# Patient Record
Sex: MALE | Race: OTHER | HISPANIC OR LATINO | URBAN - METROPOLITAN AREA
[De-identification: names, ages, dates, MRNs, and addresses within clinical notes are randomized per-mention and may not be internally consistent; named-entity substitution may affect disease eponyms.]

---

## 2017-08-30 ENCOUNTER — OUTPATIENT (OUTPATIENT)
Dept: OUTPATIENT SERVICES | Facility: HOSPITAL | Age: 62
LOS: 1 days | End: 2017-08-30
Payer: COMMERCIAL

## 2017-08-30 VITALS
SYSTOLIC BLOOD PRESSURE: 156 MMHG | HEIGHT: 64 IN | WEIGHT: 149.91 LBS | TEMPERATURE: 98 F | RESPIRATION RATE: 16 BRPM | DIASTOLIC BLOOD PRESSURE: 80 MMHG | HEART RATE: 66 BPM

## 2017-08-30 DIAGNOSIS — I25.10 ATHEROSCLEROTIC HEART DISEASE OF NATIVE CORONARY ARTERY WITHOUT ANGINA PECTORIS: ICD-10-CM

## 2017-08-30 DIAGNOSIS — K42.9 UMBILICAL HERNIA WITHOUT OBSTRUCTION OR GANGRENE: ICD-10-CM

## 2017-08-30 DIAGNOSIS — Z95.1 PRESENCE OF AORTOCORONARY BYPASS GRAFT: Chronic | ICD-10-CM

## 2017-08-30 DIAGNOSIS — I10 ESSENTIAL (PRIMARY) HYPERTENSION: ICD-10-CM

## 2017-08-30 LAB
ALBUMIN SERPL ELPH-MCNC: 4.6 G/DL — SIGNIFICANT CHANGE UP (ref 3.3–5)
ALP SERPL-CCNC: 102 U/L — SIGNIFICANT CHANGE UP (ref 40–120)
ALT FLD-CCNC: 16 U/L — SIGNIFICANT CHANGE UP (ref 4–41)
AST SERPL-CCNC: 24 U/L — SIGNIFICANT CHANGE UP (ref 4–40)
BILIRUB SERPL-MCNC: 0.5 MG/DL — SIGNIFICANT CHANGE UP (ref 0.2–1.2)
BUN SERPL-MCNC: 19 MG/DL — SIGNIFICANT CHANGE UP (ref 7–23)
CALCIUM SERPL-MCNC: 9.8 MG/DL — SIGNIFICANT CHANGE UP (ref 8.4–10.5)
CHLORIDE SERPL-SCNC: 102 MMOL/L — SIGNIFICANT CHANGE UP (ref 98–107)
CO2 SERPL-SCNC: 22 MMOL/L — SIGNIFICANT CHANGE UP (ref 22–31)
CREAT SERPL-MCNC: 0.88 MG/DL — SIGNIFICANT CHANGE UP (ref 0.5–1.3)
GLUCOSE SERPL-MCNC: 112 MG/DL — HIGH (ref 70–99)
HCT VFR BLD CALC: 41 % — SIGNIFICANT CHANGE UP (ref 39–50)
HGB BLD-MCNC: 13.5 G/DL — SIGNIFICANT CHANGE UP (ref 13–17)
MCHC RBC-ENTMCNC: 27.9 PG — SIGNIFICANT CHANGE UP (ref 27–34)
MCHC RBC-ENTMCNC: 32.9 % — SIGNIFICANT CHANGE UP (ref 32–36)
MCV RBC AUTO: 84.7 FL — SIGNIFICANT CHANGE UP (ref 80–100)
NRBC # FLD: 0 — SIGNIFICANT CHANGE UP
PLATELET # BLD AUTO: 218 K/UL — SIGNIFICANT CHANGE UP (ref 150–400)
PMV BLD: 10 FL — SIGNIFICANT CHANGE UP (ref 7–13)
POTASSIUM SERPL-MCNC: 4.2 MMOL/L — SIGNIFICANT CHANGE UP (ref 3.5–5.3)
POTASSIUM SERPL-SCNC: 4.2 MMOL/L — SIGNIFICANT CHANGE UP (ref 3.5–5.3)
PROT SERPL-MCNC: 8.3 G/DL — SIGNIFICANT CHANGE UP (ref 6–8.3)
RBC # BLD: 4.84 M/UL — SIGNIFICANT CHANGE UP (ref 4.2–5.8)
RBC # FLD: 14.4 % — SIGNIFICANT CHANGE UP (ref 10.3–14.5)
SODIUM SERPL-SCNC: 138 MMOL/L — SIGNIFICANT CHANGE UP (ref 135–145)
WBC # BLD: 11.03 K/UL — HIGH (ref 3.8–10.5)
WBC # FLD AUTO: 11.03 K/UL — HIGH (ref 3.8–10.5)

## 2017-08-30 PROCEDURE — 93010 ELECTROCARDIOGRAM REPORT: CPT

## 2017-08-30 NOTE — H&P PST ADULT - NEGATIVE CARDIOVASCULAR SYMPTOMS
no claudication/no peripheral edema/no palpitations/no dyspnea on exertion/no chest pain/no paroxysmal nocturnal dyspnea/no orthopnea

## 2017-08-30 NOTE — H&P PST ADULT - RS GEN PE MLT RESP DETAILS PC
breath sounds equal/good air movement/no rales/no rhonchi/airway patent/no wheezes/respirations non-labored

## 2017-08-30 NOTE — H&P PST ADULT - PMH
CAD (coronary artery disease)    Hepatitis C infection    HTN (hypertension)    Hyperlipidemia    Hyperopia of both eyes with astigmatism and presbyopia  right eye  Psoriasis

## 2017-08-30 NOTE — H&P PST ADULT - NEGATIVE ENMT SYMPTOMS
no tinnitus/no nasal discharge/no abnormal taste sensation/no nasal obstruction/no sinus symptoms/no post-nasal discharge/no vertigo/no gum bleeding/no ear pain/no nasal congestion/no nose bleeds

## 2017-08-30 NOTE — H&P PST ADULT - NSANTHOSAYNRD_GEN_A_CORE
No. ANNI screening performed.  STOP BANG Legend: 0-2 = LOW Risk; 3-4 = INTERMEDIATE Risk; 5-8 = HIGH Risk

## 2017-08-30 NOTE — H&P PST ADULT - PROBLEM SELECTOR PLAN 1
scheduled for umbilical hernia repair on 09/07/17  pending labs, stress/echo report & medical clearance from Dr Vale  surgical scrub & preop instructions given & explained, pt verbalized understanding

## 2017-08-30 NOTE — H&P PST ADULT - FAMILY HISTORY
Mother  Still living? Yes, Estimated age: Age Unknown  Family history of cirrhosis of liver, Age at diagnosis: Age Unknown

## 2017-08-30 NOTE — H&P PST ADULT - HISTORY OF PRESENT ILLNESS
63 y/o male with hx CAD, HTN, Hyperlipidemia, s/p CABG (Double Bypass) - 2015, presents for preop evaluation for Umbilical Hernia Repair on 09/07/17. Pt states he was doing sit-ups 3 weeks ago & felt a pop at umbilical site with pain. Pt was referred for further evaluation & surgery recommended. 61 y/o male with hx CAD, HTN, Hyperlipidemia, s/p CABG (Double Bypass) - 2015, presents for preop evaluation for Umbilical Hernia Repair on 09/07/17. Pt states he was doing sit-ups 3 weeks ago & "felt a pop at umbilical site with pain". Pt was referred for further evaluation & surgery recommended.

## 2017-08-30 NOTE — H&P PST ADULT - VISION (WITH CORRECTIVE LENSES IF THE PATIENT USUALLY WEARS THEM):
blind right eye/Partially impaired: cannot see medication labels or newsprint, but can see obstacles in path, and the surrounding layout; can count fingers at arm's length

## 2017-08-30 NOTE — H&P PST ADULT - NEGATIVE NEUROLOGICAL SYMPTOMS
no syncope/no difficulty walking/no weakness/no headache/no paresthesias/no generalized seizures/no focal seizures/no loss of sensation/no transient paralysis/no tremors

## 2017-08-30 NOTE — H&P PST ADULT - NEGATIVE MUSCULOSKELETAL SYMPTOMS
no back pain/no arthralgia/no arthritis/no stiffness/no neck pain/no muscle cramps/no joint swelling/no myalgia/no muscle weakness

## 2017-09-07 ENCOUNTER — TRANSCRIPTION ENCOUNTER (OUTPATIENT)
Age: 62
End: 2017-09-07

## 2017-09-07 ENCOUNTER — OUTPATIENT (OUTPATIENT)
Dept: OUTPATIENT SERVICES | Facility: HOSPITAL | Age: 62
LOS: 1 days | Discharge: HOME | End: 2017-09-07

## 2017-09-07 VITALS
TEMPERATURE: 98 F | DIASTOLIC BLOOD PRESSURE: 73 MMHG | OXYGEN SATURATION: 99 % | HEART RATE: 47 BPM | RESPIRATION RATE: 16 BRPM | WEIGHT: 149.91 LBS | HEIGHT: 63.78 IN | SYSTOLIC BLOOD PRESSURE: 141 MMHG

## 2017-09-07 VITALS
TEMPERATURE: 98 F | OXYGEN SATURATION: 99 % | SYSTOLIC BLOOD PRESSURE: 140 MMHG | DIASTOLIC BLOOD PRESSURE: 80 MMHG | RESPIRATION RATE: 16 BRPM | HEART RATE: 48 BPM

## 2017-09-07 DIAGNOSIS — Z95.1 PRESENCE OF AORTOCORONARY BYPASS GRAFT: Chronic | ICD-10-CM

## 2017-09-07 DIAGNOSIS — K42.9 UMBILICAL HERNIA WITHOUT OBSTRUCTION OR GANGRENE: ICD-10-CM

## 2017-09-07 NOTE — ASU DISCHARGE PLAN (ADULT/PEDIATRIC). - SPECIAL INSTRUCTIONS
DO NOT take any Tylenol (Acetaminophen) or narcotics containing Tylenol until after  9:30 PM. You received Tylenol during your operation and it can cause damage to your liver if too much is taken within a 24 hour time period.     Do not take aspirin until tomorrow.     No heady lifting. No exercise until cleared by MD.

## 2017-09-07 NOTE — ASU DISCHARGE PLAN (ADULT/PEDIATRIC). - NOTIFY
Persistent Nausea and Vomiting/Bleeding that does not stop/Fever greater than 101/Swelling that continues Pain not relieved by Medications/Fever greater than 101/Persistent Nausea and Vomiting/Bleeding that does not stop/Swelling that continues/Inability to Tolerate Liquids or Foods/Unable to Urinate

## 2017-09-07 NOTE — ASU DISCHARGE PLAN (ADULT/PEDIATRIC). - MEDICATION SUMMARY - MEDICATIONS TO TAKE
I will START or STAY ON the medications listed below when I get home from the hospital:    see medication section  --     -- Indication: For do not take aspirin until tomorrow    Percocet 5/325 oral tablet  -- 1 tab(s) by mouth every 4 hours MDD:6  -- Caution federal law prohibits the transfer of this drug to any person other  than the person for whom it was prescribed.  May cause drowsiness.  Alcohol may intensify this effect.  Use care when operating dangerous machinery.  This prescription cannot be refilled.  This product contains acetaminophen.  Do not use  with any other product containing acetaminophen to prevent possible liver damage.  Using more of this medication than prescribed may cause serious breathing problems.    -- Indication: For s/p umbilical hernia repair. do not drive while taking percocet

## 2018-02-06 ENCOUNTER — APPOINTMENT (OUTPATIENT)
Dept: CARDIOLOGY | Facility: CLINIC | Age: 63
End: 2018-02-06
Payer: COMMERCIAL

## 2018-02-06 VITALS
BODY MASS INDEX: 25.75 KG/M2 | SYSTOLIC BLOOD PRESSURE: 169 MMHG | WEIGHT: 150 LBS | HEART RATE: 53 BPM | DIASTOLIC BLOOD PRESSURE: 86 MMHG | OXYGEN SATURATION: 99 %

## 2018-02-06 PROCEDURE — 99203 OFFICE O/P NEW LOW 30 MIN: CPT

## 2018-02-06 PROCEDURE — 93000 ELECTROCARDIOGRAM COMPLETE: CPT

## 2018-02-06 RX ORDER — ATORVASTATIN CALCIUM 40 MG/1
40 TABLET, FILM COATED ORAL
Qty: 90 | Refills: 0 | Status: DISCONTINUED | COMMUNITY
Start: 2018-01-20

## 2018-02-06 RX ORDER — FLUTICASONE PROPIONATE 50 UG/1
50 SPRAY, METERED NASAL
Qty: 16 | Refills: 0 | Status: DISCONTINUED | COMMUNITY
Start: 2018-01-13

## 2018-02-06 RX ORDER — PREDNISONE 5 MG/1
5 TABLET ORAL
Qty: 30 | Refills: 0 | Status: DISCONTINUED | COMMUNITY
Start: 2017-08-29

## 2018-02-06 RX ORDER — OXYCODONE AND ACETAMINOPHEN 5; 325 MG/1; MG/1
5-325 TABLET ORAL
Qty: 30 | Refills: 0 | Status: DISCONTINUED | COMMUNITY
Start: 2017-09-07

## 2018-02-06 RX ORDER — LEVOCETIRIZINE DIHYDROCHLORIDE 5 MG/1
5 TABLET ORAL
Qty: 90 | Refills: 0 | Status: DISCONTINUED | COMMUNITY
Start: 2018-01-11

## 2018-02-06 RX ORDER — DESONIDE 0.5 MG/G
0.05 CREAM TOPICAL
Qty: 60 | Refills: 0 | Status: DISCONTINUED | COMMUNITY
Start: 2017-10-28

## 2018-02-14 ENCOUNTER — FORM ENCOUNTER (OUTPATIENT)
Age: 63
End: 2018-02-14

## 2018-02-15 ENCOUNTER — OUTPATIENT (OUTPATIENT)
Dept: OUTPATIENT SERVICES | Facility: HOSPITAL | Age: 63
LOS: 1 days | End: 2018-02-15
Payer: COMMERCIAL

## 2018-02-15 ENCOUNTER — APPOINTMENT (OUTPATIENT)
Dept: CT IMAGING | Facility: IMAGING CENTER | Age: 63
End: 2018-02-15
Payer: COMMERCIAL

## 2018-02-15 DIAGNOSIS — I77.9 DISORDER OF ARTERIES AND ARTERIOLES, UNSPECIFIED: ICD-10-CM

## 2018-02-15 DIAGNOSIS — Z95.1 PRESENCE OF AORTOCORONARY BYPASS GRAFT: Chronic | ICD-10-CM

## 2018-02-15 PROCEDURE — 70498 CT ANGIOGRAPHY NECK: CPT

## 2018-02-15 PROCEDURE — 82565 ASSAY OF CREATININE: CPT

## 2018-02-15 PROCEDURE — 70498 CT ANGIOGRAPHY NECK: CPT | Mod: 26

## 2018-02-18 ENCOUNTER — NON-APPOINTMENT (OUTPATIENT)
Age: 63
End: 2018-02-18

## 2018-02-27 ENCOUNTER — NON-APPOINTMENT (OUTPATIENT)
Age: 63
End: 2018-02-27

## 2018-02-27 ENCOUNTER — APPOINTMENT (OUTPATIENT)
Dept: CARDIOLOGY | Facility: CLINIC | Age: 63
End: 2018-02-27
Payer: COMMERCIAL

## 2018-02-27 VITALS — SYSTOLIC BLOOD PRESSURE: 142 MMHG | DIASTOLIC BLOOD PRESSURE: 70 MMHG

## 2018-02-27 VITALS
BODY MASS INDEX: 25.75 KG/M2 | OXYGEN SATURATION: 99 % | WEIGHT: 150 LBS | HEART RATE: 55 BPM | SYSTOLIC BLOOD PRESSURE: 160 MMHG | DIASTOLIC BLOOD PRESSURE: 88 MMHG

## 2018-02-27 DIAGNOSIS — Z87.891 PERSONAL HISTORY OF NICOTINE DEPENDENCE: ICD-10-CM

## 2018-02-27 DIAGNOSIS — Z95.1 PRESENCE OF AORTOCORONARY BYPASS GRAFT: ICD-10-CM

## 2018-02-27 DIAGNOSIS — I77.9 DISORDER OF ARTERIES AND ARTERIOLES, UNSPECIFIED: ICD-10-CM

## 2018-02-27 DIAGNOSIS — I25.10 ATHEROSCLEROTIC HEART DISEASE OF NATIVE CORONARY ARTERY W/OUT ANGINA PECTORIS: ICD-10-CM

## 2018-02-27 PROCEDURE — 99214 OFFICE O/P EST MOD 30 MIN: CPT

## 2018-02-27 PROCEDURE — 93000 ELECTROCARDIOGRAM COMPLETE: CPT

## 2018-04-02 ENCOUNTER — TRANSCRIPTION ENCOUNTER (OUTPATIENT)
Age: 63
End: 2018-04-02

## 2018-04-20 ENCOUNTER — APPOINTMENT (OUTPATIENT)
Dept: VASCULAR SURGERY | Facility: CLINIC | Age: 63
End: 2018-04-20
Payer: COMMERCIAL

## 2018-04-20 VITALS
WEIGHT: 150 LBS | HEIGHT: 64 IN | HEART RATE: 57 BPM | BODY MASS INDEX: 25.61 KG/M2 | TEMPERATURE: 97.6 F | DIASTOLIC BLOOD PRESSURE: 85 MMHG | SYSTOLIC BLOOD PRESSURE: 166 MMHG

## 2018-04-20 DIAGNOSIS — I65.29 OCCLUSION AND STENOSIS OF UNSPECIFIED CAROTID ARTERY: ICD-10-CM

## 2018-04-20 PROCEDURE — 93880 EXTRACRANIAL BILAT STUDY: CPT

## 2018-04-20 PROCEDURE — 93978 VASCULAR STUDY: CPT

## 2018-04-20 PROCEDURE — 99203 OFFICE O/P NEW LOW 30 MIN: CPT

## 2018-07-24 PROBLEM — I10 ESSENTIAL (PRIMARY) HYPERTENSION: Chronic | Status: ACTIVE | Noted: 2017-08-30

## 2018-07-24 PROBLEM — H52.03 HYPERMETROPIA, BILATERAL: Chronic | Status: ACTIVE | Noted: 2017-08-30

## 2018-07-24 PROBLEM — E78.5 HYPERLIPIDEMIA, UNSPECIFIED: Chronic | Status: ACTIVE | Noted: 2017-08-30

## 2018-07-24 PROBLEM — I25.10 ATHEROSCLEROTIC HEART DISEASE OF NATIVE CORONARY ARTERY WITHOUT ANGINA PECTORIS: Chronic | Status: ACTIVE | Noted: 2017-08-30

## 2018-07-27 ENCOUNTER — APPOINTMENT (OUTPATIENT)
Dept: VASCULAR SURGERY | Facility: CLINIC | Age: 63
End: 2018-07-27

## 2018-08-20 ENCOUNTER — APPOINTMENT (OUTPATIENT)
Dept: VASCULAR SURGERY | Facility: CLINIC | Age: 63
End: 2018-08-20
Payer: COMMERCIAL

## 2018-08-20 VITALS
SYSTOLIC BLOOD PRESSURE: 153 MMHG | HEIGHT: 64 IN | DIASTOLIC BLOOD PRESSURE: 76 MMHG | TEMPERATURE: 98 F | BODY MASS INDEX: 25.61 KG/M2 | HEART RATE: 57 BPM | WEIGHT: 150 LBS

## 2018-08-20 PROCEDURE — 99213 OFFICE O/P EST LOW 20 MIN: CPT

## 2018-09-10 ENCOUNTER — APPOINTMENT (OUTPATIENT)
Dept: CARDIOLOGY | Facility: CLINIC | Age: 63
End: 2018-09-10

## 2018-09-25 ENCOUNTER — EMERGENCY (EMERGENCY)
Facility: HOSPITAL | Age: 63
LOS: 1 days | Discharge: HOME | End: 2018-09-25
Attending: EMERGENCY MEDICINE
Payer: COMMERCIAL

## 2018-09-25 VITALS
RESPIRATION RATE: 16 BRPM | DIASTOLIC BLOOD PRESSURE: 87 MMHG | OXYGEN SATURATION: 100 % | HEART RATE: 50 BPM | SYSTOLIC BLOOD PRESSURE: 156 MMHG

## 2018-09-25 VITALS
TEMPERATURE: 98 F | RESPIRATION RATE: 16 BRPM | HEIGHT: 64 IN | WEIGHT: 149.91 LBS | HEART RATE: 63 BPM | DIASTOLIC BLOOD PRESSURE: 72 MMHG | OXYGEN SATURATION: 95 % | SYSTOLIC BLOOD PRESSURE: 155 MMHG

## 2018-09-25 DIAGNOSIS — Z95.1 PRESENCE OF AORTOCORONARY BYPASS GRAFT: Chronic | ICD-10-CM

## 2018-09-25 LAB
ALBUMIN SERPL ELPH-MCNC: 4.5 G/DL — SIGNIFICANT CHANGE UP (ref 3.3–5)
ALP SERPL-CCNC: 102 U/L — SIGNIFICANT CHANGE UP (ref 40–120)
ALT FLD-CCNC: 22 U/L — SIGNIFICANT CHANGE UP (ref 10–45)
ANION GAP SERPL CALC-SCNC: 8 MMOL/L — SIGNIFICANT CHANGE UP (ref 5–17)
APTT BLD: 24.5 SEC — LOW (ref 27.5–37.4)
AST SERPL-CCNC: 27 U/L — SIGNIFICANT CHANGE UP (ref 10–40)
BASOPHILS # BLD AUTO: 0 K/UL — SIGNIFICANT CHANGE UP (ref 0–0.2)
BASOPHILS NFR BLD AUTO: 0.1 % — SIGNIFICANT CHANGE UP (ref 0–2)
BILIRUB SERPL-MCNC: 0.5 MG/DL — SIGNIFICANT CHANGE UP (ref 0.2–1.2)
BUN SERPL-MCNC: 16 MG/DL — SIGNIFICANT CHANGE UP (ref 7–23)
CALCIUM SERPL-MCNC: 9.6 MG/DL — SIGNIFICANT CHANGE UP (ref 8.4–10.5)
CHLORIDE SERPL-SCNC: 104 MMOL/L — SIGNIFICANT CHANGE UP (ref 96–108)
CO2 SERPL-SCNC: 26 MMOL/L — SIGNIFICANT CHANGE UP (ref 22–31)
CREAT SERPL-MCNC: 0.84 MG/DL — SIGNIFICANT CHANGE UP (ref 0.5–1.3)
EOSINOPHIL # BLD AUTO: 0.1 K/UL — SIGNIFICANT CHANGE UP (ref 0–0.5)
EOSINOPHIL NFR BLD AUTO: 2.6 % — SIGNIFICANT CHANGE UP (ref 0–6)
GLUCOSE SERPL-MCNC: 87 MG/DL — SIGNIFICANT CHANGE UP (ref 70–99)
HCT VFR BLD CALC: 42.8 % — SIGNIFICANT CHANGE UP (ref 39–50)
HGB BLD-MCNC: 14.3 G/DL — SIGNIFICANT CHANGE UP (ref 13–17)
INR BLD: 1.07 RATIO — SIGNIFICANT CHANGE UP (ref 0.88–1.16)
LYMPHOCYTES # BLD AUTO: 2 K/UL — SIGNIFICANT CHANGE UP (ref 1–3.3)
LYMPHOCYTES # BLD AUTO: 38.4 % — SIGNIFICANT CHANGE UP (ref 13–44)
MCHC RBC-ENTMCNC: 28.8 PG — SIGNIFICANT CHANGE UP (ref 27–34)
MCHC RBC-ENTMCNC: 33.5 GM/DL — SIGNIFICANT CHANGE UP (ref 32–36)
MCV RBC AUTO: 86.1 FL — SIGNIFICANT CHANGE UP (ref 80–100)
MONOCYTES # BLD AUTO: 0.5 K/UL — SIGNIFICANT CHANGE UP (ref 0–0.9)
MONOCYTES NFR BLD AUTO: 8.9 % — SIGNIFICANT CHANGE UP (ref 2–14)
NEUTROPHILS # BLD AUTO: 2.7 K/UL — SIGNIFICANT CHANGE UP (ref 1.8–7.4)
NEUTROPHILS NFR BLD AUTO: 49.9 % — SIGNIFICANT CHANGE UP (ref 43–77)
OB PNL STL: NEGATIVE — SIGNIFICANT CHANGE UP
PLATELET # BLD AUTO: 233 K/UL — SIGNIFICANT CHANGE UP (ref 150–400)
POTASSIUM SERPL-MCNC: 4.3 MMOL/L — SIGNIFICANT CHANGE UP (ref 3.5–5.3)
POTASSIUM SERPL-SCNC: 4.3 MMOL/L — SIGNIFICANT CHANGE UP (ref 3.5–5.3)
PROT SERPL-MCNC: 8.1 G/DL — SIGNIFICANT CHANGE UP (ref 6–8.3)
PROTHROM AB SERPL-ACNC: 11.6 SEC — SIGNIFICANT CHANGE UP (ref 9.8–12.7)
RBC # BLD: 4.97 M/UL — SIGNIFICANT CHANGE UP (ref 4.2–5.8)
RBC # FLD: 13.9 % — SIGNIFICANT CHANGE UP (ref 10.3–14.5)
SODIUM SERPL-SCNC: 138 MMOL/L — SIGNIFICANT CHANGE UP (ref 135–145)
WBC # BLD: 5.4 K/UL — SIGNIFICANT CHANGE UP (ref 3.8–10.5)
WBC # FLD AUTO: 5.4 K/UL — SIGNIFICANT CHANGE UP (ref 3.8–10.5)

## 2018-09-25 PROCEDURE — 85730 THROMBOPLASTIN TIME PARTIAL: CPT

## 2018-09-25 PROCEDURE — 82272 OCCULT BLD FECES 1-3 TESTS: CPT

## 2018-09-25 PROCEDURE — 99284 EMERGENCY DEPT VISIT MOD MDM: CPT

## 2018-09-25 PROCEDURE — 99283 EMERGENCY DEPT VISIT LOW MDM: CPT

## 2018-09-25 PROCEDURE — 80053 COMPREHEN METABOLIC PANEL: CPT

## 2018-09-25 PROCEDURE — 85610 PROTHROMBIN TIME: CPT

## 2018-09-25 PROCEDURE — 85027 COMPLETE CBC AUTOMATED: CPT

## 2018-09-25 NOTE — ED PROVIDER NOTE - NS ED ROS FT
GENERAL: No fever or chills, //             EYES: no change in vision, //             HEENT: no trouble swallowing or speaking, //             CARDIAC: no chest pain, //              PULMONARY: no cough or SOB, //             GI: no abdominal pain, no nausea or no vomiting, reports hematochezia, //             : No changes in urination,  //            SKIN: no rashes,  //            NEURO: no headache,  //             MSK: No joint pain otherwise as HPI or negative. ~Mike Koroma DO PGY1

## 2018-09-25 NOTE — ED PROVIDER NOTE - PHYSICAL EXAMINATION
General: well appearing, interactive, well nourished, no apparent distress  HEENT: extraocular movments intact, pupils equal and reactive, normal mucosa, normal oropharynx, no lesions on the lips or on oral mucosa, normal external ear, no subconjunctival pallor  Neck: supple, no lymphadeopathy, full range of motion, no nuchal rigidity  CV: regular rate and rhythm, normal S1 and S2 with no murmur, capillary refill less than two seconds  Resp: lungs clear to auscultation bilaterally, good aeration bilaterally, symmetric chest wall   Abd: soft, nontender, nondistended, normoactive bowel sounds, no organomegaly, no pulsating masses  : no CVA tenderness  MSK: full range of motion, no cyanosis, no edema, no clubbing, no immobility  Neuro: cranial nerves intact, muscle strength 5/5 in all extremities  Skin: no rashes

## 2018-09-25 NOTE — ED PROVIDER NOTE - ATTENDING CONTRIBUTION TO CARE
62 y/o male with the above documented history and HPI who on exam appears very well and comfortable. VSs noted, sclerae anicteric, MM's moist, neck supple, lungs CTA, cardiac sounds s/ audible m/r/g, abdomen soft, NT/ND, rectal as per Dr. Koroma, extremities s/ asymmetry, skin s/ rash or jaundice and neurologically intact. Screening labs ordered but there is nothing clinically evident to suggest any emergent lower GIB or upper c/ rapid transit. With now 4-5 daily BMs of the same, we will follow his labs which likely determine his ultimate treatment and disposition.

## 2018-09-25 NOTE — ED ADULT NURSE NOTE - NSIMPLEMENTINTERV_GEN_ALL_ED
Implemented All Universal Safety Interventions:  [place] to call system. Call bell, personal items and telephone within reach. Instruct patient to call for assistance. Room bathroom lighting operational. Non-slip footwear when patient is off stretcher. Physically safe environment: no spills, clutter or unnecessary equipment. Stretcher in lowest position, wheels locked, appropriate side rails in place.

## 2018-09-25 NOTE — ED PROVIDER NOTE - PROGRESS NOTE DETAILS
rectal exam performed. no gross blood or melena seen. no external hemorrhoids or lesions noted around anus. no internal hemorrhoids palpated. exam performed with fahad, PAULA Crooks  - Mike Koroma D.O. PGY1 ED eval unremarkable. Not in need of any additional emergent investigation or intervention. Discharged with results, instructions and follow-up.

## 2018-09-25 NOTE — ED PROVIDER NOTE - OBJECTIVE STATEMENT
64 YO M hx of CAD s/p bypass, liver cirrhosis, s/p umbilical hernia, hep C, pw bloody BMs. pt states for last few days he has noted the blood in the toilet bowl as well as blood on toilet paper after wiping. takes 1 325 mg ASA daily. states he has had similar episodes in the past however none have ever persisted for this long. reports hx of hemorrhoids, tx preparation H. last colonoscopy 3-4 years ago and reported normal. reports recent constipation and feeling "bloated". former IV drug use, clean last ten years. denies f/c, n/v, abd pain, hx of a fib, syncope, lightheadedness, hx of bleeding disorders, hx of CA in family.

## 2018-09-25 NOTE — ED ADULT NURSE NOTE - OBJECTIVE STATEMENT
Pt presents to ED awake, alert and ambulatory, c/o abdominal distension and BRBPR x 3 days. Pt reports he thinks he has hemorrhoids and has been seeing blood in the toilet when he strains to have a BM. Stool is soft and normal color for pt. Pt denies abdominal pain but feels like he is constipated and his belly is larger than normal. Pt states he has been exercising more than normal and thinks he may have "pulled something." Pt denies nausea, vomiting, or diarrhea, denies fever, dizziness, lightheadedness or sob. Abdomen appears distended. Skin color normal for race.

## 2018-10-02 ENCOUNTER — APPOINTMENT (OUTPATIENT)
Dept: GASTROENTEROLOGY | Facility: CLINIC | Age: 63
End: 2018-10-02
Payer: COMMERCIAL

## 2018-10-02 VITALS
SYSTOLIC BLOOD PRESSURE: 129 MMHG | HEIGHT: 64 IN | DIASTOLIC BLOOD PRESSURE: 74 MMHG | HEART RATE: 61 BPM | WEIGHT: 149 LBS | BODY MASS INDEX: 25.44 KG/M2

## 2018-10-02 DIAGNOSIS — B18.2 CHRONIC VIRAL HEPATITIS C: ICD-10-CM

## 2018-10-02 DIAGNOSIS — K59.00 CONSTIPATION, UNSPECIFIED: ICD-10-CM

## 2018-10-02 PROCEDURE — 99244 OFF/OP CNSLTJ NEW/EST MOD 40: CPT

## 2018-10-02 PROCEDURE — 82274 ASSAY TEST FOR BLOOD FECAL: CPT | Mod: QW

## 2018-10-29 ENCOUNTER — APPOINTMENT (OUTPATIENT)
Dept: VASCULAR SURGERY | Facility: CLINIC | Age: 63
End: 2018-10-29
Payer: COMMERCIAL

## 2018-10-29 VITALS
HEIGHT: 64 IN | SYSTOLIC BLOOD PRESSURE: 130 MMHG | HEART RATE: 59 BPM | DIASTOLIC BLOOD PRESSURE: 78 MMHG | WEIGHT: 149 LBS | BODY MASS INDEX: 25.44 KG/M2 | TEMPERATURE: 98.5 F

## 2018-10-29 PROCEDURE — 99213 OFFICE O/P EST LOW 20 MIN: CPT

## 2018-10-29 PROCEDURE — 93880 EXTRACRANIAL BILAT STUDY: CPT

## 2018-11-21 ENCOUNTER — APPOINTMENT (OUTPATIENT)
Dept: GASTROENTEROLOGY | Facility: CLINIC | Age: 63
End: 2018-11-21

## 2018-12-10 ENCOUNTER — OUTPATIENT (OUTPATIENT)
Dept: OUTPATIENT SERVICES | Facility: HOSPITAL | Age: 63
LOS: 1 days | Discharge: HOME | End: 2018-12-10
Payer: COMMERCIAL

## 2018-12-10 ENCOUNTER — APPOINTMENT (OUTPATIENT)
Dept: GASTROENTEROLOGY | Facility: HOSPITAL | Age: 63
End: 2018-12-10

## 2018-12-10 DIAGNOSIS — Z95.1 PRESENCE OF AORTOCORONARY BYPASS GRAFT: Chronic | ICD-10-CM

## 2018-12-10 DIAGNOSIS — K59.00 CONSTIPATION, UNSPECIFIED: ICD-10-CM

## 2018-12-10 PROCEDURE — 45378 DIAGNOSTIC COLONOSCOPY: CPT

## 2019-01-24 ENCOUNTER — APPOINTMENT (OUTPATIENT)
Dept: GASTROENTEROLOGY | Facility: CLINIC | Age: 64
End: 2019-01-24

## 2019-02-05 ENCOUNTER — APPOINTMENT (OUTPATIENT)
Dept: GASTROENTEROLOGY | Facility: CLINIC | Age: 64
End: 2019-02-05
Payer: COMMERCIAL

## 2019-02-05 VITALS
DIASTOLIC BLOOD PRESSURE: 69 MMHG | SYSTOLIC BLOOD PRESSURE: 136 MMHG | HEART RATE: 55 BPM | HEIGHT: 64 IN | WEIGHT: 145 LBS | BODY MASS INDEX: 24.75 KG/M2

## 2019-02-05 DIAGNOSIS — K62.5 HEMORRHAGE OF ANUS AND RECTUM: ICD-10-CM

## 2019-02-05 DIAGNOSIS — K64.4 RESIDUAL HEMORRHOIDAL SKIN TAGS: ICD-10-CM

## 2019-02-05 DIAGNOSIS — K21.9 GASTRO-ESOPHAGEAL REFLUX DISEASE W/OUT ESOPHAGITIS: ICD-10-CM

## 2019-02-05 DIAGNOSIS — R14.2 ERUCTATION: ICD-10-CM

## 2019-02-05 PROCEDURE — 99214 OFFICE O/P EST MOD 30 MIN: CPT

## 2019-02-05 RX ORDER — OMEPRAZOLE 20 MG/1
20 CAPSULE, DELAYED RELEASE ORAL DAILY
Qty: 30 | Refills: 0 | Status: COMPLETED | COMMUNITY
Start: 2019-02-05 | End: 2019-03-07

## 2019-02-05 RX ORDER — ASPIRIN 81 MG
81 TABLET, DELAYED RELEASE (ENTERIC COATED) ORAL
Refills: 0 | Status: ACTIVE | COMMUNITY

## 2019-02-05 RX ORDER — POLYETHYLENE GLYCOL 3350 AND ELECTROLYTES WITH LEMON FLAVOR 236; 22.74; 6.74; 5.86; 2.97 G/4L; G/4L; G/4L; G/4L; G/4L
236 POWDER, FOR SOLUTION ORAL
Qty: 1 | Refills: 0 | Status: DISCONTINUED | COMMUNITY
Start: 2018-10-02 | End: 2019-02-05

## 2019-02-05 NOTE — REVIEW OF SYSTEMS
[Feeling Poorly] : feeling poorly [Feeling Tired] : feeling tired [Heartburn] : heartburn [Negative] : Heme/Lymph

## 2019-02-05 NOTE — REASON FOR VISIT
[Follow-Up: _____] : a [unfilled] follow-up visit [FreeTextEntry1] : abdominal discomfort, bloating, burping, constipation

## 2019-02-05 NOTE — HISTORY OF PRESENT ILLNESS
[Heartburn] : denies heartburn [Nausea] : denies nausea [Vomiting] : denies vomiting [Diarrhea] : denies diarrhea [Constipation] : stable constipation [Yellow Skin Or Eyes (Jaundice)] : denies jaundice [Abdominal Pain] : denies abdominal pain [Abdominal Swelling] : denies abdominal swelling [Rectal Pain] : denies rectal pain [_________] : Performed [unfilled] [de-identified] : Jonny presents to the office today for follow up with complaints of eructation, bloating and constipation.  He was last seen when he underwent a colonoscopy on December 10, 2018\par \par He reports that after his procedure, he vague discomfort and headaches for 2-3 days.  At first, he bowel movements were good, but he has started to experience constipation, and is moving his bowels every 1-2 days.  He previously took Benefiber, but started taking Metamucil one week ago.  He has taken Miralax if he does not go for more than 2 days, and has taken it 2 or 3 times since his test.  He denies any further rectal bleeding.  He denies heartburn, but has experienced bloating and eructation at night, which seems to be more prominent when he is constipated.  \par \par He underwent a colonoscopy on December 10, 2018 for rectal bleeding which only showed internal hemorrhoids.  He also has a history of Hepatitis C which was treated at [facility]. [de-identified] : hemorrhoids

## 2019-02-05 NOTE — ASSESSMENT
[FreeTextEntry1] : 1.  Constipation.  Colonoscopy in December 2018 with hemorrhoids.\par 2.  History of rectal bleeding secondary to hemorrhoids, resolved.\par 3.  Eructation, bloating.  Differential includes GERD, gastritis, functional dyspepsia, medication induced, SIBO, lactose intolerance.\par 4.  History of hepatitis C status post Harvoni.\par 5.  CAD status post CABG.\par 6.  Moderate carotid stenosis.\par 7.  HTN.\par 8.  HLD.\par 9.  Diabetes.\par 10.  Former smoker.\par \par Recs:\par - Previous colonoscopy results reviewed.\par - The patient was advised to increase fluid and dietary fiber intake.  In addition, the patient was advised to continue Metamucil daily and Miralax as needed for severe constipation.\par - Patient was counseled on GERD lifestyle modifications including head of bed elevation at night, avoiding lying down 2-3 hours after eating, weight loss, avoiding tight-fitting clothing, eating small, frequent meals, and minimizing potential food triggers (caffeine, spicy foods, citrus foods, chocolate, mint, alcohol).\par - Trial of omeprazole 20 mg for one month.  May use ranitidine as needed after that.\par \par

## 2019-02-05 NOTE — PHYSICAL EXAM
[General Appearance - Alert] : alert [General Appearance - In No Acute Distress] : in no acute distress [General Appearance - Well Nourished] : well nourished [Sclera] : the sclera and conjunctiva were normal [PERRL With Normal Accommodation] : pupils were equal in size, round, and reactive to light [Extraocular Movements] : extraocular movements were intact [Outer Ear] : the ears and nose were normal in appearance [Hearing Threshold Finger Rub Not [place]] : hearing was normal [Neck Appearance] : the appearance of the neck was normal [Neck Cervical Mass (___cm)] : no neck mass was observed [Auscultation Breath Sounds / Voice Sounds] : lungs were clear to auscultation bilaterally [Heart Rate And Rhythm] : heart rate was normal and rhythm regular [Heart Sounds] : normal S1 and S2 [Edema] : there was no peripheral edema [Bowel Sounds] : normal bowel sounds [Abdomen Soft] : soft [Abdomen Tenderness] : non-tender [] : no hepato-splenomegaly [Abdomen Mass (___ Cm)] : no abdominal mass palpated [Abdomen Hernia] : no hernia was discovered [Cervical Lymph Nodes Enlarged Anterior Bilaterally] : anterior cervical [Supraclavicular Lymph Nodes Enlarged Bilaterally] : supraclavicular [No CVA Tenderness] : no ~M costovertebral angle tenderness [No Spinal Tenderness] : no spinal tenderness [Abnormal Walk] : normal gait [Skin Color & Pigmentation] : normal skin color and pigmentation [No Focal Deficits] : no focal deficits [Oriented To Time, Place, And Person] : oriented to person, place, and time

## 2019-06-10 ENCOUNTER — APPOINTMENT (OUTPATIENT)
Dept: VASCULAR SURGERY | Facility: CLINIC | Age: 64
End: 2019-06-10

## 2019-07-11 ENCOUNTER — APPOINTMENT (OUTPATIENT)
Dept: VASCULAR SURGERY | Facility: CLINIC | Age: 64
End: 2019-07-11
Payer: COMMERCIAL

## 2019-07-11 VITALS
SYSTOLIC BLOOD PRESSURE: 130 MMHG | HEIGHT: 64 IN | BODY MASS INDEX: 24.92 KG/M2 | TEMPERATURE: 98.4 F | HEART RATE: 60 BPM | WEIGHT: 146 LBS | DIASTOLIC BLOOD PRESSURE: 70 MMHG

## 2019-07-11 PROCEDURE — 99213 OFFICE O/P EST LOW 20 MIN: CPT

## 2019-07-11 PROCEDURE — 93880 EXTRACRANIAL BILAT STUDY: CPT

## 2019-07-11 NOTE — ASSESSMENT
[FreeTextEntry1] : Patient underwent duplex study which shows a 60-79% right internal carotid artery stenosis. Because patient is asymptomatic no intervention is necessary at this time. Patient should have a repeat duplex in 6 months and continue on aspirin at this time.

## 2019-07-11 NOTE — PHYSICAL EXAM
[JVD] : no jugular venous distention  [Normal Breath Sounds] : Normal breath sounds [Normal Rate and Rhythm] : normal rate and rhythm [2+] : left 2+ [Ankle Swelling (On Exam)] : not present [Varicose Veins Of Lower Extremities] : not present [] : not present [Abdomen Tenderness] : ~T ~M No abdominal tenderness [No Rash or Lesion] : No rash or lesion [de-identified] : appears well [Alert] : alert [Calm] : calm [de-identified] : sensorimotor intact

## 2019-07-11 NOTE — CONSULT LETTER
[Dear  ___] : Dear  [unfilled], [Courtesy Letter:] : I had the pleasure of seeing your patient, [unfilled], in my office today. [Please see my note below.] : Please see my note below. [FreeTextEntry3] : Liu Fink M.D., FCHETNA., R.P.V.I.\par \par  [facility]\par  of Vascular Surgery\par Vascular [facility] at [place]\par  [Sincerely,] : Sincerely,

## 2019-07-11 NOTE — HISTORY OF PRESENT ILLNESS
[FreeTextEntry1] : 64-year-old gentleman with a long history of carotid stenosis. Patient has no history of stroke or TIA.  He is here for followup

## 2020-01-14 ENCOUNTER — APPOINTMENT (OUTPATIENT)
Dept: VASCULAR SURGERY | Facility: CLINIC | Age: 65
End: 2020-01-14
Payer: COMMERCIAL

## 2020-01-14 PROCEDURE — 93880 EXTRACRANIAL BILAT STUDY: CPT

## 2020-01-14 PROCEDURE — 99213 OFFICE O/P EST LOW 20 MIN: CPT

## 2020-01-14 NOTE — HISTORY OF PRESENT ILLNESS
[FreeTextEntry1] : 65 yo male with history of cad s/p cabg, carotid stenosis presents for follow up.  pt without any complaints denies any history of cva

## 2020-01-14 NOTE — PHYSICAL EXAM
[No Rash or Lesion] : No rash or lesion [Alert] : alert [Calm] : calm [Ankle Swelling (On Exam)] : not present [JVD] : no jugular venous distention  [de-identified] : appears well  [Skin Ulcer] : no ulcer

## 2020-01-14 NOTE — ASSESSMENT
[FreeTextEntry1] : 65 yo male with history of cad s/p cabg, carotid stenosis presents for follow up.\par \par duplex shows increase in right ica stenosis to 60-70% with ratio of 3.9 \par \par at this time would recommend right ica endarterectomy

## 2020-01-31 ENCOUNTER — OUTPATIENT (OUTPATIENT)
Dept: OUTPATIENT SERVICES | Facility: HOSPITAL | Age: 65
LOS: 1 days | End: 2020-01-31
Payer: COMMERCIAL

## 2020-01-31 VITALS
DIASTOLIC BLOOD PRESSURE: 75 MMHG | WEIGHT: 149.91 LBS | HEART RATE: 50 BPM | TEMPERATURE: 98 F | HEIGHT: 64 IN | OXYGEN SATURATION: 99 % | RESPIRATION RATE: 16 BRPM | SYSTOLIC BLOOD PRESSURE: 163 MMHG

## 2020-01-31 DIAGNOSIS — Z91.89 OTHER SPECIFIED PERSONAL RISK FACTORS, NOT ELSEWHERE CLASSIFIED: ICD-10-CM

## 2020-01-31 DIAGNOSIS — Z01.818 ENCOUNTER FOR OTHER PREPROCEDURAL EXAMINATION: ICD-10-CM

## 2020-01-31 DIAGNOSIS — I65.29 OCCLUSION AND STENOSIS OF UNSPECIFIED CAROTID ARTERY: ICD-10-CM

## 2020-01-31 DIAGNOSIS — E11.9 TYPE 2 DIABETES MELLITUS WITHOUT COMPLICATIONS: ICD-10-CM

## 2020-01-31 DIAGNOSIS — Z29.9 ENCOUNTER FOR PROPHYLACTIC MEASURES, UNSPECIFIED: ICD-10-CM

## 2020-01-31 DIAGNOSIS — I25.10 ATHEROSCLEROTIC HEART DISEASE OF NATIVE CORONARY ARTERY WITHOUT ANGINA PECTORIS: ICD-10-CM

## 2020-01-31 DIAGNOSIS — Z95.1 PRESENCE OF AORTOCORONARY BYPASS GRAFT: Chronic | ICD-10-CM

## 2020-01-31 DIAGNOSIS — Z98.890 OTHER SPECIFIED POSTPROCEDURAL STATES: Chronic | ICD-10-CM

## 2020-01-31 LAB
ALBUMIN SERPL ELPH-MCNC: 4.4 G/DL — SIGNIFICANT CHANGE UP (ref 3.3–5)
ALP SERPL-CCNC: 104 U/L — SIGNIFICANT CHANGE UP (ref 40–120)
ALT FLD-CCNC: 30 U/L — SIGNIFICANT CHANGE UP (ref 10–45)
ANION GAP SERPL CALC-SCNC: 13 MMOL/L — SIGNIFICANT CHANGE UP (ref 5–17)
AST SERPL-CCNC: 30 U/L — SIGNIFICANT CHANGE UP (ref 10–40)
BILIRUB SERPL-MCNC: 0.5 MG/DL — SIGNIFICANT CHANGE UP (ref 0.2–1.2)
BLD GP AB SCN SERPL QL: NEGATIVE — SIGNIFICANT CHANGE UP
BUN SERPL-MCNC: 16 MG/DL — SIGNIFICANT CHANGE UP (ref 7–23)
CALCIUM SERPL-MCNC: 9.5 MG/DL — SIGNIFICANT CHANGE UP (ref 8.4–10.5)
CHLORIDE SERPL-SCNC: 105 MMOL/L — SIGNIFICANT CHANGE UP (ref 96–108)
CO2 SERPL-SCNC: 21 MMOL/L — LOW (ref 22–31)
CREAT SERPL-MCNC: 0.75 MG/DL — SIGNIFICANT CHANGE UP (ref 0.5–1.3)
GLUCOSE SERPL-MCNC: 80 MG/DL — SIGNIFICANT CHANGE UP (ref 70–99)
HBA1C BLD-MCNC: 6.9 % — HIGH (ref 4–5.6)
HCT VFR BLD CALC: 39.6 % — SIGNIFICANT CHANGE UP (ref 39–50)
HGB BLD-MCNC: 11.9 G/DL — LOW (ref 13–17)
MCHC RBC-ENTMCNC: 24.6 PG — LOW (ref 27–34)
MCHC RBC-ENTMCNC: 30.1 GM/DL — LOW (ref 32–36)
MCV RBC AUTO: 82 FL — SIGNIFICANT CHANGE UP (ref 80–100)
NRBC # BLD: 0 /100 WBCS — SIGNIFICANT CHANGE UP (ref 0–0)
PLATELET # BLD AUTO: 209 K/UL — SIGNIFICANT CHANGE UP (ref 150–400)
POTASSIUM SERPL-MCNC: 4.3 MMOL/L — SIGNIFICANT CHANGE UP (ref 3.5–5.3)
POTASSIUM SERPL-SCNC: 4.3 MMOL/L — SIGNIFICANT CHANGE UP (ref 3.5–5.3)
PROT SERPL-MCNC: 7.8 G/DL — SIGNIFICANT CHANGE UP (ref 6–8.3)
RBC # BLD: 4.83 M/UL — SIGNIFICANT CHANGE UP (ref 4.2–5.8)
RBC # FLD: 15.9 % — HIGH (ref 10.3–14.5)
RH IG SCN BLD-IMP: NEGATIVE — SIGNIFICANT CHANGE UP
SODIUM SERPL-SCNC: 139 MMOL/L — SIGNIFICANT CHANGE UP (ref 135–145)
WBC # BLD: 4.37 K/UL — SIGNIFICANT CHANGE UP (ref 3.8–10.5)
WBC # FLD AUTO: 4.37 K/UL — SIGNIFICANT CHANGE UP (ref 3.8–10.5)

## 2020-01-31 PROCEDURE — 86850 RBC ANTIBODY SCREEN: CPT

## 2020-01-31 PROCEDURE — 86900 BLOOD TYPING SEROLOGIC ABO: CPT

## 2020-01-31 PROCEDURE — 83036 HEMOGLOBIN GLYCOSYLATED A1C: CPT

## 2020-01-31 PROCEDURE — G0463: CPT

## 2020-01-31 PROCEDURE — 80053 COMPREHEN METABOLIC PANEL: CPT

## 2020-01-31 PROCEDURE — 85027 COMPLETE CBC AUTOMATED: CPT

## 2020-01-31 PROCEDURE — 86901 BLOOD TYPING SEROLOGIC RH(D): CPT

## 2020-01-31 RX ORDER — ATORVASTATIN CALCIUM 80 MG/1
1 TABLET, FILM COATED ORAL
Qty: 0 | Refills: 0 | DISCHARGE

## 2020-01-31 RX ORDER — LIDOCAINE HCL 20 MG/ML
0.2 VIAL (ML) INJECTION ONCE
Refills: 0 | Status: DISCONTINUED | OUTPATIENT
Start: 2020-02-12 | End: 2020-02-13

## 2020-01-31 RX ORDER — SODIUM CHLORIDE 9 MG/ML
3 INJECTION INTRAMUSCULAR; INTRAVENOUS; SUBCUTANEOUS EVERY 8 HOURS
Refills: 0 | Status: DISCONTINUED | OUTPATIENT
Start: 2020-02-12 | End: 2020-02-13

## 2020-01-31 NOTE — H&P PST ADULT - NSICDXPASTMEDICALHX_GEN_ALL_CORE_FT
PAST MEDICAL HISTORY:  CAD (coronary artery disease)     Diabetes mellitus, type 2     Hearing loss right ear    Hepatitis C infection treated with Artis 2015    HTN (hypertension)     Hyperlipidemia     Hyperopia of both eyes with astigmatism and presbyopia right eye    Occlusion of carotid artery right    Psoriasis PAST MEDICAL HISTORY:  CAD (coronary artery disease)     Diabetes mellitus, type 2     Hearing loss right ear    Hepatitis C infection treated with Harvoni 2015    History of alcohol abuse none since 2008    History of drug abuse none since 2008    HTN (hypertension)     Hyperlipidemia     Hyperopia of both eyes with astigmatism and presbyopia right eye    Occlusion of carotid artery right ICA 60-79%    Psoriasis

## 2020-01-31 NOTE — H&P PST ADULT - ABILITY TO HEAR (WITH HEARING AID OR HEARING APPLIANCE IF NORMALLY USED):
Mildly to Moderately Impaired: difficulty hearing in some environments or speaker may need to increase volume or speak distinctly/right hearing loss

## 2020-01-31 NOTE — H&P PST ADULT - HISTORY OF PRESENT ILLNESS
63 yo male with h/o HTN, HLD. Type 2 DM, CAD s/p CABG x 2 2015, Hepatitis C (treated with Harvoni 2015) and carotid artery stenosis s/p Carotid Dopplers 1/14/2020, which revealed 60-79% stenosis in the right internal carotid artery. Pt is scheduled for Right CEA on 2/12/2020.

## 2020-01-31 NOTE — H&P PST ADULT - DOES PATIENT MEET CRITERIA FOR SEPSIS
"  SUBJECTIVE:   Emery Barkley is a 44 year old male who presents to clinic today for the following health issues:  Chief Complaint   Patient presents with     Wart     retreat. Was seen 5/29 and warts on neck and shaving area were cryo treated.     Patient had a cluster of warts on his neck.  He has not shaved.  They have improved.  No complication from the last treatment with liquid ntg.          Problem list and histories reviewed & adjusted, as indicated.  Additional history: as documented        Reviewed and updated as needed this visit by clinical staff  Tobacco  Allergies  Meds  Med Hx  Surg Hx  Fam Hx  Soc Hx      Reviewed and updated as needed this visit by Provider         ROS:  CONSTITUTIONAL:NEGATIVE for fever, chills, change in weight  INTEGUMENTARY/SKIN: as above    OBJECTIVE:                                                    /82 (Cuff Size: Adult Large)  Pulse 76  Temp 97.3  F (36.3  C) (Tympanic)  Ht 5' 9.5\" (1.765 m)  Wt 206 lb (93.4 kg)  BMI 29.98 kg/m2  Body mass index is 29.98 kg/(m^2).  GENERAL APPEARANCE: healthy, alert and no distress  SKIN: he has about 10 warts on neck within the beard area.  They are generally all smaller.    Instructed on treatment and he agreed.  3 freeze thaw cycles done without complications.           ASSESSMENT/PLAN:                                                    1. Common wart  Retreated 10 warts ranging from 1 mm diameter to the largest to 4 mm. All elevated and verrucous like lesion.  Follow up in 3 weeks.  Last time there were 14 warts.  Today 10.  - DESTRUCT BENIGN LESION, UP TO 14      See Patient Instructions    Devyn Cox MD  [facility]  "
No

## 2020-01-31 NOTE — H&P PST ADULT - NSICDXFAMILYHX_GEN_ALL_CORE_FT
FAMILY HISTORY:  Mother  Still living? Yes, Estimated age: Age Unknown  Family history of cirrhosis of liver, Age at diagnosis: Age Unknown

## 2020-01-31 NOTE — H&P PST ADULT - ASSESSMENT
1.	Carotid Artery Stenosis  2.	T2DM  3.	CAD  4.	At Risk For Sleep Apnea  CAPRINI VTE 2.0 SCORE [CLOT updated 2019]    AGE RELATED RISK FACTORS                                                       MOBILITY RELATED FACTORS  [ ] Age 41-60 years                                            (1 Point)                    [ ] Bed rest                                                        (1 Point)  [x ] Age: 61-74 years                                           (2 Points)                  [ ] Plaster cast                                                   (2 Points)  [ ] Age= 75 years                                              (3 Points)                    [ ] Bed bound for more than 72 hours                 (2 Points)    DISEASE RELATED RISK FACTORS                                               GENDER SPECIFIC FACTORS  [ ] Edema in the lower extremities                       (1 Point)              [ ] Pregnancy                                                     (1 Point)  [ ] Varicose veins                                               (1 Point)                     [ ] Post-partum < 6 weeks                                   (1 Point)             [ x] BMI > 25 Kg/m2                                            (1 Point)                     [ ] Hormonal therapy  or oral contraception          (1 Point)                 [ ] Sepsis (in the previous month)                        (1 Point)               [ ] History of pregnancy complications                 (1 point)  [ ] Pneumonia or serious lung disease                                               [ ] Unexplained or recurrent                     (1 Point)           (in the previous month)                               (1 Point)  [ ] Abnormal pulmonary function test                     (1 Point)                 SURGERY RELATED RISK FACTORS  [ ] Acute myocardial infarction                              (1 Point)               [ ]  Section                                             (1 Point)  [ ] Congestive heart failure (in the previous month)  (1 Point)      [ ] Minor surgery                                                  (1 Point)   [ ] Inflammatory bowel disease                             (1 Point)               [ ] Arthroscopic surgery                                        (2 Points)  [ ] Central venous access                                      (2 Points)                [x ] General surgery lasting more than 45 minutes (2 points)  [ ] Malignancy- Present or previous                   (2 Points)                [ ] Elective arthroplasty                                         (5 points)    [ ] Stroke (in the previous month)                          (5 Points)                                                                                                                                                           HEMATOLOGY RELATED FACTORS                                                 TRAUMA RELATED RISK FACTORS  [ ] Prior episodes of VTE                                     (3 Points)                [ ] Fracture of the hip, pelvis, or leg                       (5 Points)  [ ] Positive family history for VTE                         (3 Points)             [ ] Acute spinal cord injury (in the previous month)  (5 Points)  [ ] Prothrombin 23290 A                                     (3 Points)               [ ] Paralysis  (less than 1 month)                             (5 Points)  [ ] Factor V Leiden                                             (3 Points)                  [ ] Multiple Trauma within 1 month                        (5 Points)  [ ] Lupus anticoagulants                                     (3 Points)                                                           [ ] Anticardiolipin antibodies                               (3 Points)                                                       [ ] High homocysteine in the blood                      (3 Points)                                             [ ] Other congenital or acquired thrombophilia      (3 Points)                                                [ ] Heparin induced thrombocytopenia                  (3 Points)                                     Total Score [       5   ]

## 2020-01-31 NOTE — H&P PST ADULT - NSICDXPROBLEM_GEN_ALL_CORE_FT
PROBLEM DIAGNOSES  Problem: Carotid artery stenosis  Assessment and Plan: Right CEA    Problem: Type 2 diabetes mellitus  Assessment and Plan: Pt instructed to hold metformin 24 hours preop and check FS on am of surgery  Stat FS on admission    Problem: CAD, multiple vessel  Assessment and Plan: Called Cardiologist Dr Da Silva's office to clarify Medical Evaluation 1/2019 notes regarding abnormal Stress Test in 7/2019 - awaiting response  Pt will continue aspirin to OR per surgeon    Problem: At risk for sleep apnea  Assessment and Plan: ANNI Precautions  OR Booking notified    Problem: Need for prophylactic measure  Assessment and Plan: The Caprini score indicates this patient is at risk for a VTE event (score 3-5).  Most surgical patients in this group would benefit from pharmacologic prophylaxis.  The surgical team will determine the balance between VTE risk and bleeding risk

## 2020-01-31 NOTE — H&P PST ADULT - NSICDXPASTSURGICALHX_GEN_ALL_CORE_FT
PAST SURGICAL HISTORY:  H/O colonoscopy 2019    H/O coronary artery bypass surgery CABG x 2 -10/15/2015    History of umbilical hernia repair 2017

## 2020-02-11 ENCOUNTER — TRANSCRIPTION ENCOUNTER (OUTPATIENT)
Age: 65
End: 2020-02-11

## 2020-02-11 VITALS
HEART RATE: 50 BPM | TEMPERATURE: 98 F | HEIGHT: 64 IN | SYSTOLIC BLOOD PRESSURE: 163 MMHG | OXYGEN SATURATION: 99 % | DIASTOLIC BLOOD PRESSURE: 75 MMHG | RESPIRATION RATE: 16 BRPM | WEIGHT: 149.91 LBS

## 2020-02-11 NOTE — PRE-ANESTHESIA EVALUATION ADULT - NSRADCARDRESULTSFT_GEN_ALL_CORE
[id number]: Global left ventricular function was normal. EF estimated was  55 %.  CORONARY VESSELS: The coronary circulation is left dominant.  LM:   --  Proximal left main: There was a discrete 60 % stenosis.  LAD:   --  Ostial LAD: There was a 70 % stenosis.  --  Proximal LAD: There was a 90 % stenosis.  CX:   --  Proximal circumflex: There was a 90 % stenosis.  --  LPDA: There was a discrete 40 % stenosis in the proximal third of the  vessel segment.  RI:   --  Proximal ramus intermedius: There was a 50 % stenosis.  RCA:   --  Mid RCA: There was a 100 % stenosis.

## 2020-02-11 NOTE — PRE-ANESTHESIA EVALUATION ADULT - NSANTHPMHFT_GEN_ALL_CORE
65 yo male with h/o HTN, HLD. Type 2 DM, CAD s/p CABG x 2 2015, Hepatitis C (treated with Harvoni 2015); s/p Carotid Dopplers 1/14/2020: 60-79% stenosis in the right internal carotid artery

## 2020-02-12 ENCOUNTER — APPOINTMENT (OUTPATIENT)
Dept: VASCULAR SURGERY | Facility: HOSPITAL | Age: 65
End: 2020-02-12
Payer: COMMERCIAL

## 2020-02-12 ENCOUNTER — INPATIENT (INPATIENT)
Facility: HOSPITAL | Age: 65
LOS: 0 days | Discharge: HOME | DRG: 39 | End: 2020-02-13
Attending: SURGERY | Admitting: SURGERY
Payer: COMMERCIAL

## 2020-02-12 ENCOUNTER — TRANSCRIPTION ENCOUNTER (OUTPATIENT)
Age: 65
End: 2020-02-12

## 2020-02-12 ENCOUNTER — RESULT REVIEW (OUTPATIENT)
Age: 65
End: 2020-02-12

## 2020-02-12 DIAGNOSIS — Z95.1 PRESENCE OF AORTOCORONARY BYPASS GRAFT: Chronic | ICD-10-CM

## 2020-02-12 DIAGNOSIS — Z98.890 OTHER SPECIFIED POSTPROCEDURAL STATES: Chronic | ICD-10-CM

## 2020-02-12 DIAGNOSIS — I65.29 OCCLUSION AND STENOSIS OF UNSPECIFIED CAROTID ARTERY: ICD-10-CM

## 2020-02-12 LAB
GLUCOSE BLDC GLUCOMTR-MCNC: 109 MG/DL — HIGH (ref 70–99)
GLUCOSE BLDC GLUCOMTR-MCNC: 112 MG/DL — HIGH (ref 70–99)
GLUCOSE BLDC GLUCOMTR-MCNC: 141 MG/DL — HIGH (ref 70–99)
GLUCOSE BLDC GLUCOMTR-MCNC: 92 MG/DL — SIGNIFICANT CHANGE UP (ref 70–99)

## 2020-02-12 PROCEDURE — 88311 DECALCIFY TISSUE: CPT | Mod: 26

## 2020-02-12 PROCEDURE — 88304 TISSUE EXAM BY PATHOLOGIST: CPT | Mod: 26

## 2020-02-12 PROCEDURE — 35301 RECHANNELING OF ARTERY: CPT | Mod: RT

## 2020-02-12 RX ORDER — SODIUM CHLORIDE 9 MG/ML
1000 INJECTION INTRAMUSCULAR; INTRAVENOUS; SUBCUTANEOUS
Refills: 0 | Status: DISCONTINUED | OUTPATIENT
Start: 2020-02-12 | End: 2020-02-13

## 2020-02-12 RX ORDER — GLUCAGON INJECTION, SOLUTION 0.5 MG/.1ML
1 INJECTION, SOLUTION SUBCUTANEOUS ONCE
Refills: 0 | Status: DISCONTINUED | OUTPATIENT
Start: 2020-02-12 | End: 2020-02-13

## 2020-02-12 RX ORDER — PHENYLEPHRINE HYDROCHLORIDE 10 MG/ML
0.5 INJECTION INTRAVENOUS
Qty: 40 | Refills: 0 | Status: DISCONTINUED | OUTPATIENT
Start: 2020-02-12 | End: 2020-02-12

## 2020-02-12 RX ORDER — ENOXAPARIN SODIUM 100 MG/ML
40 INJECTION SUBCUTANEOUS DAILY
Refills: 0 | Status: DISCONTINUED | OUTPATIENT
Start: 2020-02-12 | End: 2020-02-13

## 2020-02-12 RX ORDER — ASPIRIN/CALCIUM CARB/MAGNESIUM 324 MG
81 TABLET ORAL DAILY
Refills: 0 | Status: DISCONTINUED | OUTPATIENT
Start: 2020-02-12 | End: 2020-02-13

## 2020-02-12 RX ORDER — DEXTROSE 50 % IN WATER 50 %
25 SYRINGE (ML) INTRAVENOUS ONCE
Refills: 0 | Status: DISCONTINUED | OUTPATIENT
Start: 2020-02-12 | End: 2020-02-13

## 2020-02-12 RX ORDER — CHLORHEXIDINE GLUCONATE 213 G/1000ML
1 SOLUTION TOPICAL ONCE
Refills: 0 | Status: COMPLETED | OUTPATIENT
Start: 2020-02-12 | End: 2020-02-12

## 2020-02-12 RX ORDER — ATORVASTATIN CALCIUM 80 MG/1
20 TABLET, FILM COATED ORAL AT BEDTIME
Refills: 0 | Status: DISCONTINUED | OUTPATIENT
Start: 2020-02-12 | End: 2020-02-13

## 2020-02-12 RX ORDER — METOPROLOL TARTRATE 50 MG
50 TABLET ORAL
Refills: 0 | Status: DISCONTINUED | OUTPATIENT
Start: 2020-02-12 | End: 2020-02-13

## 2020-02-12 RX ORDER — SODIUM CHLORIDE 9 MG/ML
1000 INJECTION, SOLUTION INTRAVENOUS
Refills: 0 | Status: DISCONTINUED | OUTPATIENT
Start: 2020-02-12 | End: 2020-02-13

## 2020-02-12 RX ORDER — SODIUM CHLORIDE 9 MG/ML
500 INJECTION INTRAMUSCULAR; INTRAVENOUS; SUBCUTANEOUS ONCE
Refills: 0 | Status: COMPLETED | OUTPATIENT
Start: 2020-02-12 | End: 2020-02-12

## 2020-02-12 RX ORDER — ONDANSETRON 8 MG/1
4 TABLET, FILM COATED ORAL ONCE
Refills: 0 | Status: DISCONTINUED | OUTPATIENT
Start: 2020-02-12 | End: 2020-02-12

## 2020-02-12 RX ORDER — DEXTROSE 50 % IN WATER 50 %
15 SYRINGE (ML) INTRAVENOUS ONCE
Refills: 0 | Status: DISCONTINUED | OUTPATIENT
Start: 2020-02-12 | End: 2020-02-13

## 2020-02-12 RX ORDER — INSULIN LISPRO 100/ML
VIAL (ML) SUBCUTANEOUS
Refills: 0 | Status: DISCONTINUED | OUTPATIENT
Start: 2020-02-12 | End: 2020-02-13

## 2020-02-12 RX ORDER — INSULIN LISPRO 100/ML
VIAL (ML) SUBCUTANEOUS AT BEDTIME
Refills: 0 | Status: DISCONTINUED | OUTPATIENT
Start: 2020-02-12 | End: 2020-02-13

## 2020-02-12 RX ORDER — DEXTROSE 50 % IN WATER 50 %
12.5 SYRINGE (ML) INTRAVENOUS ONCE
Refills: 0 | Status: DISCONTINUED | OUTPATIENT
Start: 2020-02-12 | End: 2020-02-13

## 2020-02-12 RX ORDER — HYDROMORPHONE HYDROCHLORIDE 2 MG/ML
0.5 INJECTION INTRAMUSCULAR; INTRAVENOUS; SUBCUTANEOUS
Refills: 0 | Status: DISCONTINUED | OUTPATIENT
Start: 2020-02-12 | End: 2020-02-12

## 2020-02-12 RX ADMIN — ENOXAPARIN SODIUM 40 MILLIGRAM(S): 100 INJECTION SUBCUTANEOUS at 11:38

## 2020-02-12 RX ADMIN — CHLORHEXIDINE GLUCONATE 1 APPLICATION(S): 213 SOLUTION TOPICAL at 06:04

## 2020-02-12 RX ADMIN — PHENYLEPHRINE HYDROCHLORIDE 12.75 MICROGRAM(S)/KG/MIN: 10 INJECTION INTRAVENOUS at 10:42

## 2020-02-12 RX ADMIN — SODIUM CHLORIDE 3 MILLILITER(S): 9 INJECTION INTRAMUSCULAR; INTRAVENOUS; SUBCUTANEOUS at 22:29

## 2020-02-12 RX ADMIN — SODIUM CHLORIDE 3 MILLILITER(S): 9 INJECTION INTRAMUSCULAR; INTRAVENOUS; SUBCUTANEOUS at 06:05

## 2020-02-12 RX ADMIN — SODIUM CHLORIDE 3 MILLILITER(S): 9 INJECTION INTRAMUSCULAR; INTRAVENOUS; SUBCUTANEOUS at 13:22

## 2020-02-12 RX ADMIN — SODIUM CHLORIDE 75 MILLILITER(S): 9 INJECTION INTRAMUSCULAR; INTRAVENOUS; SUBCUTANEOUS at 10:42

## 2020-02-12 RX ADMIN — Medication 0: at 11:35

## 2020-02-12 RX ADMIN — SODIUM CHLORIDE 1000 MILLILITER(S): 9 INJECTION INTRAMUSCULAR; INTRAVENOUS; SUBCUTANEOUS at 20:20

## 2020-02-12 RX ADMIN — ATORVASTATIN CALCIUM 20 MILLIGRAM(S): 80 TABLET, FILM COATED ORAL at 22:46

## 2020-02-12 RX ADMIN — Medication 81 MILLIGRAM(S): at 11:38

## 2020-02-12 NOTE — PATIENT PROFILE ADULT - ABILITY TO HEAR (WITH HEARING AID OR HEARING APPLIANCE IF NORMALLY USED):
right hearing loss/Mildly to Moderately Impaired: difficulty hearing in some environments or speaker may need to increase volume or speak distinctly

## 2020-02-12 NOTE — PRE-OP CHECKLIST - ISOLATION PRECAUTIONS
MsNessa Janneth Gooden returned phone call.  Said she was on her way to the office and will speak with Dr. Theresa Chapman when she gets here none

## 2020-02-12 NOTE — CHART NOTE - NSCHARTNOTEFT_GEN_A_CORE
POST-OPERATIVE NOTE    Subjective:  Patient is s/p Right carotid endarterectomy with Dacron patch angioplasty. Recovering appropriately. Denies n/v, changes in vision or headache. Tolerating dinner. Denies any pain. Voiding spontaneously.     Vital Signs Last 24 Hrs  T(C): 36.5 (12 Feb 2020 16:00), Max: 36.7 (12 Feb 2020 05:37)  T(F): 97.7 (12 Feb 2020 16:00), Max: 98.1 (12 Feb 2020 05:37)  HR: 46 (12 Feb 2020 17:15) (46 - 60)  BP: 110/57 (12 Feb 2020 17:15) (85/47 - 163/75)  BP(mean): 79 (12 Feb 2020 17:15) (68 - 86)  RR: 16 (12 Feb 2020 17:15) (14 - 19)  SpO2: 99% (12 Feb 2020 17:15) (95% - 100%)  I&O's Detail    12 Feb 2020 07:01  -  12 Feb 2020 17:40  --------------------------------------------------------  IN:    Oral Fluid: 915 mL    phenylephrine   Infusion: 18.2 mL    sodium chloride 0.9%.: 450 mL  Total IN: 1383.2 mL    OUT:    Voided: 600 mL  Total OUT: 600 mL    Total NET: 783.2 mL        aspirin enteric coated 81  enoxaparin Injectable 40  metoprolol tartrate 50  phenylephrine    Infusion 0.5    PAST MEDICAL & SURGICAL HISTORY:  History of drug abuse: none since 2008  History of alcohol abuse: none since 2008  Hearing loss: right ear  Diabetes mellitus, type 2  Occlusion of carotid artery: right ICA 60-79%  Hyperopia of both eyes with astigmatism and presbyopia: right eye  Hyperlipidemia  HTN (hypertension)  CAD (coronary artery disease)  Psoriasis  Hepatitis C infection: treated with Harvoni 2015  H/O colonoscopy: 2019  History of umbilical hernia repair: 2017  H/O coronary artery bypass surgery: CABG x 2 -10/15/2015        Physical Exam:  General: NAD, resting comfortably in bed  Neuri: No deficits  Pulmonary: Nonlabored breathing, no respiratory distress  Cardiovascular: NSR  Abdominal: soft, NT/ND  Extremities: WWP, moves all 4 extermities      LABS:            CAPILLARY BLOOD GLUCOSE      POCT Blood Glucose.: 112 mg/dL (12 Feb 2020 16:43)  POCT Blood Glucose.: 109 mg/dL (12 Feb 2020 11:32)  POCT Blood Glucose.: 92 mg/dL (12 Feb 2020 05:53)      Radiology and Additional Studies:    Assessment:  The patient is a 64y Male who is now several hours post-op from a right carotid endarterectomy with Dacron patch angioplasty      Plan:  - Pain control as needed  - -140  - Reg diet  - DVT ppx  - OOB and ambulating as tolerated  - F/u AM labs

## 2020-02-12 NOTE — DISCHARGE NOTE NURSING/CASE MANAGEMENT/SOCIAL WORK - PATIENT PORTAL LINK FT
You can access the FollowMyHealth Patient Portal offered by [facility] by registering at the following website: [email or web address]. By joining [facility]’s FollowMyHealth portal, you will also be able to view your health information using other applications (apps) compatible with our system.

## 2020-02-13 ENCOUNTER — TRANSCRIPTION ENCOUNTER (OUTPATIENT)
Age: 65
End: 2020-02-13

## 2020-02-13 VITALS
SYSTOLIC BLOOD PRESSURE: 135 MMHG | OXYGEN SATURATION: 97 % | HEART RATE: 47 BPM | TEMPERATURE: 98 F | DIASTOLIC BLOOD PRESSURE: 67 MMHG | RESPIRATION RATE: 18 BRPM

## 2020-02-13 LAB
ANION GAP SERPL CALC-SCNC: 10 MMOL/L — SIGNIFICANT CHANGE UP (ref 5–17)
BUN SERPL-MCNC: 16 MG/DL — SIGNIFICANT CHANGE UP (ref 7–23)
CALCIUM SERPL-MCNC: 8.3 MG/DL — LOW (ref 8.4–10.5)
CHLORIDE SERPL-SCNC: 107 MMOL/L — SIGNIFICANT CHANGE UP (ref 96–108)
CHOLEST SERPL-MCNC: 101 MG/DL — SIGNIFICANT CHANGE UP (ref 10–199)
CO2 SERPL-SCNC: 22 MMOL/L — SIGNIFICANT CHANGE UP (ref 22–31)
CREAT SERPL-MCNC: 0.77 MG/DL — SIGNIFICANT CHANGE UP (ref 0.5–1.3)
GLUCOSE BLDC GLUCOMTR-MCNC: 134 MG/DL — HIGH (ref 70–99)
GLUCOSE SERPL-MCNC: 132 MG/DL — HIGH (ref 70–99)
HCT VFR BLD CALC: 32.8 % — LOW (ref 39–50)
HDLC SERPL-MCNC: 34 MG/DL — LOW
HGB BLD-MCNC: 10 G/DL — LOW (ref 13–17)
LIPID PNL WITH DIRECT LDL SERPL: 57 MG/DL — SIGNIFICANT CHANGE UP
MAGNESIUM SERPL-MCNC: 2.2 MG/DL — SIGNIFICANT CHANGE UP (ref 1.6–2.6)
MCHC RBC-ENTMCNC: 24.8 PG — LOW (ref 27–34)
MCHC RBC-ENTMCNC: 30.5 GM/DL — LOW (ref 32–36)
MCV RBC AUTO: 81.2 FL — SIGNIFICANT CHANGE UP (ref 80–100)
NRBC # BLD: 0 /100 WBCS — SIGNIFICANT CHANGE UP (ref 0–0)
PHOSPHATE SERPL-MCNC: 2.3 MG/DL — LOW (ref 2.5–4.5)
PLATELET # BLD AUTO: 177 K/UL — SIGNIFICANT CHANGE UP (ref 150–400)
POTASSIUM SERPL-MCNC: 4.2 MMOL/L — SIGNIFICANT CHANGE UP (ref 3.5–5.3)
POTASSIUM SERPL-SCNC: 4.2 MMOL/L — SIGNIFICANT CHANGE UP (ref 3.5–5.3)
RBC # BLD: 4.04 M/UL — LOW (ref 4.2–5.8)
RBC # FLD: 15.9 % — HIGH (ref 10.3–14.5)
SODIUM SERPL-SCNC: 139 MMOL/L — SIGNIFICANT CHANGE UP (ref 135–145)
TOTAL CHOLESTEROL/HDL RATIO MEASUREMENT: 3 RATIO — LOW (ref 3.4–9.6)
TRIGL SERPL-MCNC: 50 MG/DL — SIGNIFICANT CHANGE UP (ref 10–149)
WBC # BLD: 8.66 K/UL — SIGNIFICANT CHANGE UP (ref 3.8–10.5)
WBC # FLD AUTO: 8.66 K/UL — SIGNIFICANT CHANGE UP (ref 3.8–10.5)

## 2020-02-13 PROCEDURE — 82962 GLUCOSE BLOOD TEST: CPT

## 2020-02-13 PROCEDURE — 80048 BASIC METABOLIC PNL TOTAL CA: CPT

## 2020-02-13 PROCEDURE — 88311 DECALCIFY TISSUE: CPT

## 2020-02-13 PROCEDURE — 83735 ASSAY OF MAGNESIUM: CPT

## 2020-02-13 PROCEDURE — 85027 COMPLETE CBC AUTOMATED: CPT

## 2020-02-13 PROCEDURE — 88304 TISSUE EXAM BY PATHOLOGIST: CPT

## 2020-02-13 PROCEDURE — 84100 ASSAY OF PHOSPHORUS: CPT

## 2020-02-13 PROCEDURE — C1768: CPT

## 2020-02-13 PROCEDURE — 80061 LIPID PANEL: CPT

## 2020-02-13 PROCEDURE — C1889: CPT

## 2020-02-13 RX ORDER — SODIUM,POTASSIUM PHOSPHATES 278-250MG
1 POWDER IN PACKET (EA) ORAL ONCE
Refills: 0 | Status: COMPLETED | OUTPATIENT
Start: 2020-02-13 | End: 2020-02-13

## 2020-02-13 RX ORDER — SODIUM CHLORIDE 9 MG/ML
500 INJECTION INTRAMUSCULAR; INTRAVENOUS; SUBCUTANEOUS ONCE
Refills: 0 | Status: COMPLETED | OUTPATIENT
Start: 2020-02-13 | End: 2020-02-13

## 2020-02-13 RX ADMIN — Medication 1 PACKET(S): at 10:20

## 2020-02-13 RX ADMIN — SODIUM CHLORIDE 1000 MILLILITER(S): 9 INJECTION INTRAMUSCULAR; INTRAVENOUS; SUBCUTANEOUS at 01:11

## 2020-02-13 RX ADMIN — ENOXAPARIN SODIUM 40 MILLIGRAM(S): 100 INJECTION SUBCUTANEOUS at 11:24

## 2020-02-13 RX ADMIN — SODIUM CHLORIDE 3 MILLILITER(S): 9 INJECTION INTRAMUSCULAR; INTRAVENOUS; SUBCUTANEOUS at 05:31

## 2020-02-13 RX ADMIN — Medication 81 MILLIGRAM(S): at 11:21

## 2020-02-13 NOTE — PROGRESS NOTE ADULT - ASSESSMENT
64M s/p R CEA with dacron patch graft    Plan:  -d/c home today  -remove dressing tomorrow, can shower tomorrow  -f/u in 2 weeks    VASCULAR SURGERY  [id number]

## 2020-02-13 NOTE — DISCHARGE NOTE PROVIDER - NSDCCPCAREPLAN_GEN_ALL_CORE_FT
PRINCIPAL DISCHARGE DIAGNOSIS  Diagnosis: Carotid artery stenosis  Assessment and Plan of Treatment:       SECONDARY DISCHARGE DIAGNOSES  Diagnosis: CAD, multiple vessel  Assessment and Plan of Treatment:     Diagnosis: Type 2 diabetes mellitus  Assessment and Plan of Treatment:

## 2020-02-13 NOTE — DISCHARGE NOTE PROVIDER - HOSPITAL COURSE
65 yo male with h/o HTN, HLD. Type 2 DM, CAD s/p CABG x 2 2015, Hepatitis C (treated with Harvoni 2015) and carotid artery stenosis s/p Carotid Dopplers 1/14/2020, which revealed 60-79% stenosis in the right internal carotid artery. Pt underwent a Right CEA on 2/12. Pt's procedure went well without any complications. Pt was given a bolus of IV fluids overnight for low blood pressure which has now improved. Pt became hypophosphetemic and was repleted. On day of discharge, the patient's vitals are stable, tolerating diet, ambulating well, voiding adequately and pain controlled. Pt feels comfortable being discharged home. Pt will follow up with Dr. Fink in 1 week after discharge. Pt will follow up with his PCP in 1-2 weeks. 63 yo male with h/o HTN, HLD. Type 2 DM, CAD s/p CABG x 2 2015, Hepatitis C (treated with Harvoni 2015) and carotid artery stenosis s/p Carotid Dopplers 1/14/2020, which revealed 60-79% stenosis in the right internal carotid artery. Pt underwent a Right CEA on 2/12. Pt's procedure went well without any complications. CEA guidelines were followed. Pt was given a bolus of IV fluids overnight for low blood pressure which has now improved. Pt became hypophosphetemic and was repleted. On day of discharge, the patient's vitals are stable, tolerating diet, ambulating well, voiding adequately and pain controlled. Pt feels comfortable being discharged home. Pt will follow up with Dr. Fink in 1 week after discharge. Pt will follow up with his PCP in 1-2 weeks.

## 2020-02-13 NOTE — DISCHARGE NOTE PROVIDER - NSDCMRMEDTOKEN_GEN_ALL_CORE_FT
aspirin 81 mg oral tablet: 1 tab(s) orally once a day  atorvastatin 20 mg oral tablet: 1 tab(s) orally once a day  metFORMIN 500 mg oral tablet, extended release: 1 tab(s) orally once a day  Metoprolol Tartrate 50 mg oral tablet: 1 tab(s) orally 2 times a day

## 2020-02-13 NOTE — DISCHARGE NOTE PROVIDER - NSDCACTIVITY_GEN_ALL_CORE
Walking - Outdoors allowed/Walking - Indoors allowed/Stairs allowed/Showering allowed/No heavy lifting/straining

## 2020-02-13 NOTE — PROGRESS NOTE ADULT - SUBJECTIVE AND OBJECTIVE BOX
Vascular Surgery Progress Note     S: Patient resting comfortably on morning rounds. Pain well-controlled currently.    ROBERT overnight, bolus'ed over night, SBP improved    Physical Exam:    General: NAD  HEENT: CN II-XII grossly intact, R neck dressing c/d/i    T(C): 36.6 (02-13-20 @ 05:41), Max: 36.7 (02-12-20 @ 22:45)  HR: 53 (02-13-20 @ 05:41) (45 - 63)  BP: 119/64 (02-13-20 @ 05:41) (85/47 - 138/68)  RR: 18 (02-13-20 @ 05:41) (14 - 19)  SpO2: 94% (02-13-20 @ 05:41) (92% - 100%)    02-12-20 @ 07:01  -  02-13-20 @ 07:00  --------------------------------------------------------  IN: 4653.2 mL / OUT: 1750 mL / NET: 2903.2 mL        LABS:                        10.0   8.66  )-----------( 177      ( 13 Feb 2020 07:28 )             32.8

## 2020-02-13 NOTE — DISCHARGE NOTE PROVIDER - NSDCFUADDINST_GEN_ALL_CORE_FT
Please takes all medications as prescribed   You will be continuing to take aspirin and atorvastatin at home

## 2020-02-13 NOTE — DISCHARGE NOTE PROVIDER - NSDCFUADDAPPT_GEN_ALL_CORE_FT
Please follow up and make an appointment with Dr. Fink in 1 week after discharge    Please follow up with your Primary Care Physician in 1-2 weeks after discharge

## 2020-02-13 NOTE — DISCHARGE NOTE PROVIDER - NSDCCPTREATMENT_GEN_ALL_CORE_FT
PRINCIPAL PROCEDURE  Procedure: Carotid endarterectomy  Findings and Treatment: WOUND CARE:   Leave the white steri strips in place, they will fall off on their own in approximately 5-7 days.    BATHING: Please do not submerge wound underwater. You may shower and/or sponge bathe. Allow soapy water to run over incision site, do not rub. Pat dry when done showering  ACTIVITY: No heavy lifting anything more than 10-15lbs or straining. Otherwise, you may return to your usual level of physical activity. If you are taking narcotic pain medication (such as Percocet), do NOT drive a car, operate machinery or make important decisions.  DIET: Regular  NOTIFY YOUR SURGEON IF: You have any bleeding that does not stop, any pus draining from your wound, any fever (over 100.4 F) or chills, persistent nausea/vomiting with inability to tolerate food or liquids, persistent diarrhea, or if your pain is not controlled on your discharge pain medications.  FOLLOW-UP:  1. Please call to make a follow-up appointment within one week of discharge with Dr.; Fink  2. Please follow up with your primary care physician in one week regarding your hospitalization.

## 2020-02-14 ENCOUNTER — EMERGENCY (EMERGENCY)
Facility: HOSPITAL | Age: 65
LOS: 1 days | Discharge: HOME | End: 2020-02-14
Attending: EMERGENCY MEDICINE
Payer: COMMERCIAL

## 2020-02-14 VITALS
SYSTOLIC BLOOD PRESSURE: 158 MMHG | RESPIRATION RATE: 18 BRPM | HEART RATE: 54 BPM | HEIGHT: 64 IN | DIASTOLIC BLOOD PRESSURE: 82 MMHG | TEMPERATURE: 98 F | OXYGEN SATURATION: 97 % | WEIGHT: 139.99 LBS

## 2020-02-14 DIAGNOSIS — Z95.1 PRESENCE OF AORTOCORONARY BYPASS GRAFT: Chronic | ICD-10-CM

## 2020-02-14 DIAGNOSIS — Z98.890 OTHER SPECIFIED POSTPROCEDURAL STATES: Chronic | ICD-10-CM

## 2020-02-14 PROBLEM — E11.9 TYPE 2 DIABETES MELLITUS WITHOUT COMPLICATIONS: Chronic | Status: ACTIVE | Noted: 2020-01-31

## 2020-02-14 PROBLEM — I65.29 OCCLUSION AND STENOSIS OF UNSPECIFIED CAROTID ARTERY: Chronic | Status: ACTIVE | Noted: 2020-01-31

## 2020-02-14 PROBLEM — F10.11 ALCOHOL ABUSE, IN REMISSION: Chronic | Status: ACTIVE | Noted: 2020-01-31

## 2020-02-14 PROBLEM — H91.90 UNSPECIFIED HEARING LOSS, UNSPECIFIED EAR: Chronic | Status: ACTIVE | Noted: 2020-01-31

## 2020-02-14 PROBLEM — F19.11 OTHER PSYCHOACTIVE SUBSTANCE ABUSE, IN REMISSION: Chronic | Status: ACTIVE | Noted: 2020-01-31

## 2020-02-14 LAB
ALBUMIN SERPL ELPH-MCNC: 4 G/DL — SIGNIFICANT CHANGE UP (ref 3.3–5)
ALP SERPL-CCNC: 79 U/L — SIGNIFICANT CHANGE UP (ref 40–120)
ALT FLD-CCNC: 25 U/L — SIGNIFICANT CHANGE UP (ref 10–45)
ANION GAP SERPL CALC-SCNC: 12 MMOL/L — SIGNIFICANT CHANGE UP (ref 5–17)
APTT BLD: 26.7 SEC — LOW (ref 27.5–36.3)
AST SERPL-CCNC: 34 U/L — SIGNIFICANT CHANGE UP (ref 10–40)
BILIRUB SERPL-MCNC: 0.8 MG/DL — SIGNIFICANT CHANGE UP (ref 0.2–1.2)
BUN SERPL-MCNC: 20 MG/DL — SIGNIFICANT CHANGE UP (ref 7–23)
CALCIUM SERPL-MCNC: 8.8 MG/DL — SIGNIFICANT CHANGE UP (ref 8.4–10.5)
CHLORIDE SERPL-SCNC: 106 MMOL/L — SIGNIFICANT CHANGE UP (ref 96–108)
CO2 SERPL-SCNC: 22 MMOL/L — SIGNIFICANT CHANGE UP (ref 22–31)
CREAT SERPL-MCNC: 0.77 MG/DL — SIGNIFICANT CHANGE UP (ref 0.5–1.3)
GLUCOSE SERPL-MCNC: 97 MG/DL — SIGNIFICANT CHANGE UP (ref 70–99)
HCT VFR BLD CALC: 37.5 % — LOW (ref 39–50)
HGB BLD-MCNC: 11.6 G/DL — LOW (ref 13–17)
INR BLD: 1.05 RATIO — SIGNIFICANT CHANGE UP (ref 0.88–1.16)
MCHC RBC-ENTMCNC: 25.3 PG — LOW (ref 27–34)
MCHC RBC-ENTMCNC: 30.9 GM/DL — LOW (ref 32–36)
MCV RBC AUTO: 81.7 FL — SIGNIFICANT CHANGE UP (ref 80–100)
NRBC # BLD: 0 /100 WBCS — SIGNIFICANT CHANGE UP (ref 0–0)
PLATELET # BLD AUTO: 218 K/UL — SIGNIFICANT CHANGE UP (ref 150–400)
POTASSIUM SERPL-MCNC: 4 MMOL/L — SIGNIFICANT CHANGE UP (ref 3.5–5.3)
POTASSIUM SERPL-SCNC: 4 MMOL/L — SIGNIFICANT CHANGE UP (ref 3.5–5.3)
PROT SERPL-MCNC: 7.4 G/DL — SIGNIFICANT CHANGE UP (ref 6–8.3)
PROTHROM AB SERPL-ACNC: 12.1 SEC — SIGNIFICANT CHANGE UP (ref 10–12.9)
RBC # BLD: 4.59 M/UL — SIGNIFICANT CHANGE UP (ref 4.2–5.8)
RBC # FLD: 16.5 % — HIGH (ref 10.3–14.5)
SODIUM SERPL-SCNC: 140 MMOL/L — SIGNIFICANT CHANGE UP (ref 135–145)
TROPONIN T, HIGH SENSITIVITY RESULT: 9 NG/L — SIGNIFICANT CHANGE UP (ref 0–51)
WBC # BLD: 7.91 K/UL — SIGNIFICANT CHANGE UP (ref 3.8–10.5)
WBC # FLD AUTO: 7.91 K/UL — SIGNIFICANT CHANGE UP (ref 3.8–10.5)

## 2020-02-14 PROCEDURE — 99283 EMERGENCY DEPT VISIT LOW MDM: CPT | Mod: GC

## 2020-02-14 PROCEDURE — 71046 X-RAY EXAM CHEST 2 VIEWS: CPT | Mod: 26

## 2020-02-14 PROCEDURE — 99220: CPT

## 2020-02-14 PROCEDURE — 70496 CT ANGIOGRAPHY HEAD: CPT | Mod: 26

## 2020-02-14 PROCEDURE — 70498 CT ANGIOGRAPHY NECK: CPT | Mod: 26

## 2020-02-14 RX ORDER — GLUCAGON INJECTION, SOLUTION 0.5 MG/.1ML
1 INJECTION, SOLUTION SUBCUTANEOUS ONCE
Refills: 0 | Status: DISCONTINUED | OUTPATIENT
Start: 2020-02-14 | End: 2020-02-22

## 2020-02-14 RX ORDER — INSULIN LISPRO 100/ML
VIAL (ML) SUBCUTANEOUS
Refills: 0 | Status: DISCONTINUED | OUTPATIENT
Start: 2020-02-14 | End: 2020-02-22

## 2020-02-14 RX ORDER — DEXTROSE 50 % IN WATER 50 %
12.5 SYRINGE (ML) INTRAVENOUS ONCE
Refills: 0 | Status: DISCONTINUED | OUTPATIENT
Start: 2020-02-14 | End: 2020-02-22

## 2020-02-14 RX ORDER — METOPROLOL TARTRATE 50 MG
50 TABLET ORAL
Refills: 0 | Status: DISCONTINUED | OUTPATIENT
Start: 2020-02-14 | End: 2020-02-22

## 2020-02-14 RX ORDER — DEXTROSE 50 % IN WATER 50 %
15 SYRINGE (ML) INTRAVENOUS ONCE
Refills: 0 | Status: DISCONTINUED | OUTPATIENT
Start: 2020-02-14 | End: 2020-02-22

## 2020-02-14 RX ORDER — DEXTROSE 50 % IN WATER 50 %
25 SYRINGE (ML) INTRAVENOUS ONCE
Refills: 0 | Status: DISCONTINUED | OUTPATIENT
Start: 2020-02-14 | End: 2020-02-22

## 2020-02-14 RX ORDER — ATORVASTATIN CALCIUM 80 MG/1
1 TABLET, FILM COATED ORAL
Qty: 0 | Refills: 0 | DISCHARGE

## 2020-02-14 RX ORDER — ATORVASTATIN CALCIUM 80 MG/1
40 TABLET, FILM COATED ORAL AT BEDTIME
Refills: 0 | Status: DISCONTINUED | OUTPATIENT
Start: 2020-02-14 | End: 2020-02-22

## 2020-02-14 RX ORDER — SODIUM CHLORIDE 9 MG/ML
1000 INJECTION, SOLUTION INTRAVENOUS
Refills: 0 | Status: DISCONTINUED | OUTPATIENT
Start: 2020-02-14 | End: 2020-02-22

## 2020-02-14 RX ORDER — SENNA PLUS 8.6 MG/1
2 TABLET ORAL AT BEDTIME
Refills: 0 | Status: DISCONTINUED | OUTPATIENT
Start: 2020-02-14 | End: 2020-02-22

## 2020-02-14 RX ORDER — INSULIN LISPRO 100/ML
VIAL (ML) SUBCUTANEOUS AT BEDTIME
Refills: 0 | Status: DISCONTINUED | OUTPATIENT
Start: 2020-02-14 | End: 2020-02-22

## 2020-02-14 RX ORDER — ASPIRIN/CALCIUM CARB/MAGNESIUM 324 MG
81 TABLET ORAL DAILY
Refills: 0 | Status: DISCONTINUED | OUTPATIENT
Start: 2020-02-14 | End: 2020-02-22

## 2020-02-14 RX ADMIN — Medication 50 MILLIGRAM(S): at 21:34

## 2020-02-14 RX ADMIN — ATORVASTATIN CALCIUM 40 MILLIGRAM(S): 80 TABLET, FILM COATED ORAL at 21:34

## 2020-02-14 NOTE — ED CDU PROVIDER INITIAL DAY NOTE - PSH
H/O carotid endarterectomy  R ICA  H/O colonoscopy  2019  H/O coronary artery bypass surgery  CABG x 2 -10/15/2015  History of umbilical hernia repair  2017

## 2020-02-14 NOTE — ED PROVIDER NOTE - PHYSICAL EXAMINATION
Gen: AAOx3, non-toxic  Head: NCAT  HEENT: EOMI, PERRLA, oral mucosa moist, normal conjunctiva dressing on right neck  Lung: CTAB, no respiratory distress, no wheezes/rhonchi/rales B/L, speaking in full sentences  CV: RRR, no murmurs, rubs or gallops  Abd: soft, NTND, no guarding, no CVA tenderness, no rebound tenderness  MSK: no visible deformities, full range of motion of all 4 exts  Neuro: Awake, alert, and oriented.  Cranial nerves 2-12 intact.  5/5 strength in all muscle groups.  2+ patellar reflexes bilaterally.  Cerebellar function intact by finger-to-nose testing.  Sensation grossly intact.  Normal gait.   Skin: Warm, well perfused, no rash  Psych: normal affect.   ~Joshua Lopez MD

## 2020-02-14 NOTE — CONSULT NOTE ADULT - ATTENDING COMMENTS
Pt feeling better this morning. Was able to eat as long he as took his time, minimal discomfort. No hoarseness of voice, no cough, no headache, no neck pain.  Palate raises equally, uvula midline, erythematous pharynx, no exudates.     MRI negative for stroke. Symptoms likely 2/2 inflammation from recent intubation.     Pt instructed to f/u with hi surgeon and PCP. Recommended he follow up with Speech and Swallow therapist. Chloraseptic or lozenges as needed to numb the throat.

## 2020-02-14 NOTE — ED PROVIDER NOTE - PMH
CAD (coronary artery disease)    Diabetes mellitus, type 2    Hearing loss  right ear  Hepatitis C infection  treated with Harvreggie 2015  History of alcohol abuse  none since 2008  History of drug abuse  none since 2008  HTN (hypertension)    Hyperlipidemia    Hyperopia of both eyes with astigmatism and presbyopia  right eye  Occlusion of carotid artery  right ICA 60-79%  Psoriasis

## 2020-02-14 NOTE — ED PROVIDER NOTE - NS ED ROS FT
GENERAL: No fever or chills, EYES: no change in vision, HEENT: +difficulty swallowing, no trouble speaking, CARDIAC: no chest pain, palpitation PULMONARY: no cough or + SOB, GI: no abdominal pain, no nausea, no vomiting, no diarrhea or constipation, : No changes in urination, SKIN: no rashes, NEURO: +tingling no headache,  MSK: No muscle pain ~Joshua Lopez MD

## 2020-02-14 NOTE — ED ADULT NURSE NOTE - NSIMPLEMENTINTERV_GEN_ALL_ED
Implemented All Fall with Harm Risk Interventions:  [place] to call system. Call bell, personal items and telephone within reach. Instruct patient to call for assistance. Room bathroom lighting operational. Non-slip footwear when patient is off stretcher. Physically safe environment: no spills, clutter or unnecessary equipment. Stretcher in lowest position, wheels locked, appropriate side rails in place. Provide visual cue, wrist band, yellow gown, etc. Monitor gait and stability. Monitor for mental status changes and reorient to person, place, and time. Review medications for side effects contributing to fall risk. Reinforce activity limits and safety measures with patient and family. Provide visual clues: red socks.
No

## 2020-02-14 NOTE — ED CDU PROVIDER INITIAL DAY NOTE - CRANIAL NERVE AND PUPILLARY EXAM
cranial nerves 2-12 intact/extra-ocular movements intact cranial nerves 2-12 intact/extra-ocular movements intact/neuro exam normal for pt, + mild L facial droop at mouth (baseline) and 4+ strength LUE (baseline) other ext 5/5.

## 2020-02-14 NOTE — ED CDU PROVIDER INITIAL DAY NOTE - FAMILY HISTORY
Mother  Still living? Yes, Estimated age: Age Unknown  Family history of cirrhosis of liver, Age at diagnosis: Age Unknown  Family history of alcoholism, Age at diagnosis: Age Unknown     Grandparent  Still living? Unknown  Family history of stroke, Age at diagnosis: Age Unknown

## 2020-02-14 NOTE — ED PROVIDER NOTE - OBJECTIVE STATEMENT
65 yo male with h/o HTN, HLD. Type 2 DM, CAD s/p CABG x 2 2015, Hepatitis C (treated with Harvoni 2015) and carotid artery stenosis s/p Right CEA on 2/12 p/w SOB, difficulty swallow and tingling in the back of his head on the right x 4 hrs. pt state that he feels like the site of the surgery feels swollen. Pt states that he has had difficulty swallowing in the past but today was worst. Denies fevers, chills, cough, weakness, visual changes.

## 2020-02-14 NOTE — ED PROVIDER NOTE - CLINICAL SUMMARY MEDICAL DECISION MAKING FREE TEXT BOX
65 yo male with h/o HTN, HLD. Type 2 DM, CAD s/p CABG x 2 2015, Hepatitis C (treated with Harvoni 2015) and carotid artery stenosis s/p Right CEA on 2/12 p/w SOB, difficulty swallow and tingling in the back of his head on the right x 4 hrs. r/o stroke vs post surgical complication vs ACS. will get labs trop, ct head and neck consult vascular

## 2020-02-14 NOTE — CONSULT NOTE ADULT - SUBJECTIVE AND OBJECTIVE BOX
Attending: Dr. Fink     Patient is a 64y old  Male who presents with a chief complaint of dysphagia and sob s/p R. CEA 2 days ago.     HPI:  65 yo male with h/o HTN, HLD. Type 2 DM, CAD s/p CABG x 2 2015, Hepatitis C (treated with Harvoni 2015) and carotid artery stenosis s/p Right CEA on 2/12 with Dr. Fink p/w SOB, difficulty swallow and tingling of scalp since 3am this morning. Per patient he was recovering well from CEA and discharged yesterday with no issues, tolerating regular diet with no trouble swallowing or sob. He states that once home he went to bed and woke up at 3am and had difficulty catching his breath with "irregular breathing pattern" as well as swallowing phlegm. Breathing episodes lasted <30min, but states he has continued to have minimal trouble swallowing with associated throat soreness and a small change in tone of voice. Denies numbness/tingling, weakness, dizziness/lightheadedness, or drooling. Per pt he has also noted tingling on the back of his scalp 3 times lasting approx 5-10 seconds each time, symptoms noted when rising from laying or sitting. Denies change in mental status, vision changes, HA. Pt able to drink while in ED, although reports difficulty with large volumes of fluid.       PAST MEDICAL & SURGICAL HISTORY:  History of drug abuse: none since 2008  History of alcohol abuse: none since 2008  Hearing loss: right ear  Diabetes mellitus, type 2  Occlusion of carotid artery: right ICA 60-79%  Hyperopia of both eyes with astigmatism and presbyopia: right eye  Hyperlipidemia  HTN (hypertension)  CAD (coronary artery disease)  Psoriasis  Hepatitis C infection: treated with Harvoni 2015  H/O colonoscopy: 2019  History of umbilical hernia repair: 2017  H/O coronary artery bypass surgery: CABG x 2 -10/15/2015      ROS: Negative except for HPI    MEDICATIONS:  Aspirin 81mg daily   Atorvastatin 20mg daily   Metformin 500mg ER   Metoprolol 50mg 1 tab BID        Allergies/Intolerance     penicillin (Hives; Urticaria; Rash)    SOCIAL HISTORY: Denies tobacco, social ETOH, denies illicit drug use    FAMILY HISTORY:  Family history of cirrhosis of liver (Mother)    Vital Signs Last 24 Hrs  T(C): 36.7 (14 Feb 2020 07:25), Max: 36.7 (13 Feb 2020 12:40)  T(F): 98 (14 Feb 2020 07:25), Max: 98.1 (13 Feb 2020 12:40)  HR: 55 (14 Feb 2020 08:03) (47 - 55)  BP: 166/77 (14 Feb 2020 08:03) (135/67 - 166/77)  BP(mean): --  RR: 18 (14 Feb 2020 08:03) (18 - 18)  SpO2: 99% (14 Feb 2020 08:03) (97% - 99%)    PHYSICAL EXAM:  Constitutional: NAD well-developed  HEENT: PERRLA, EOMI  Neck: No JVD, incision with dressing c/d/i. No surrounding swelling or erythema. No evidence of hematoma.   Respiratory: CTAB, Cardiovascular: S1 and S2, RRR, no M/G/R  Gastrointestinal: soft, NT/ND  Extremities: No peripheral edema  Vascular: 2+ peripheral pulses  Neurological: A/O x 3, no focal deficits. CN II-XII grossly intact. Sensation grossly intact.     LABS:                        11.6   7.91  )-----------( 218      ( 14 Feb 2020 08:21 )             37.5     02-14    140  |  106  |  20  ----------------------------<  97  4.0   |  22  |  0.77    Ca    8.8      14 Feb 2020 08:21  Phos  2.3     02-13  Mg     2.2     02-13    TPro  7.4  /  Alb  4.0  /  TBili  0.8  /  DBili  x   /  AST  34  /  ALT  25  /  AlkPhos  79  02-14    PT/INR - ( 14 Feb 2020 08:21 )   PT: 12.1 sec;   INR: 1.05 ratio         PTT - ( 14 Feb 2020 08:21 )  PTT:26.7 sec    Hemoglobin A1C, Whole Blood: 6.9 % (01-31 @ 21:48)      RADIOLOGY & ADDITIONAL STUDIES:  EXAM:  CT ANGIO BRAIN (W)AW IC                          EXAM:  CT BRAIN                          EXAM:  CT ANGIO NECK (W)AW IC                            PROCEDURE DATE:  02/14/2020            INTERPRETATION:  CT angiography of the brain and neck    CLINICAL INDICATION: s/p CEA right side with diff swallowing and  tingling    TECHNIQUE: Direct axial CT scanning of the brain and neck was obtained from the vertex to the level of the clavicular heads after the dynamic intravenous injection of 70 cc of Omnipaque 300. 30 cc were discarded. Sagittal and coronal maximum intensity projection reformats were provided.  Three-dimensional reconstructions were performed by the radiologist using the [employer] workstation.    Additionally, noncontrast axial CT scanning of the brain was obtained from the skull base to the vertex.    COMPARISON: CTA head and neck 2/15/2018.    FINDINGS:     CT BRAIN:    There is no hydrocephalus, mass effect, midline shift, vasogenic edema, or acute intracranial hemorrhage.  There is no CT evidence of acute territorial infarct. Minimal white matter microvascular ischemic disease is noted.    The visualized paranasal sinuses and mastoid air cells are clear.    CTA BRAIN:    There is good flow-related enhancement of the bilateral anterior, middle, and posterior cerebral arteries, and within the basilar artery. The intracranial / skull base internal carotid and intracranial vertebral arteries demonstrate normal flow-related enhancement.     There is no vascular aneurysm or flow-limiting stenosis. No AVM. Anterior communicating artery present. Diminutive right posterior communicating artery is noted. Bilateral P-comm infundibula are present.    CTA NECK:    Interval right carotid endarterectomy.    Approximately 30% short segment stenosis by NASCET criteria of the proximal right internal carotid artery beginning 1.5 cm distal to its origin. Normal flow-related enhancement of the more distal right internal carotid artery.    Noncalcified plaque at the origin of the left internal carotid artery does not result in significant luminal stenosis. Normal flow-related enhancement of the more distal left internal carotid artery.    Normal flow-related enhancement of the bilateral common carotid arteries. The origins of the common carotid arteries are unremarkable.    Origins of the vertebral arteries are not well evaluated. Approximately 55% short segment stenosis by NASCET criteria of the V2 segment of the right vertebral artery at the C6-C7 level.     Otherwise, normal flow-related enhancement of the bilateral vertebral arteries in the neck.    No evidence for arterial dissection.    IMPRESSION:    CT BRAIN:  No acute intracranial hemorrhage, mass effect, vasogenic edema, or evidence of acute territorial infarct.    CTA BRAIN:  No vascular aneurysm or flow-limiting stenosis. No evidence for AVM.    CTA NECK:  Interval right carotid endarterectomy.  Approximately 30% short segment stenosis by NASCET criteria of the proximal right internal carotid artery.  Approximately 55% short segment stenosis of the V2 segment of the right vertebral artery at the C6-C7 level.

## 2020-02-14 NOTE — CONSULT NOTE ADULT - ASSESSMENT
ASSESSMENT/PLAN:  63 yo male with h/o HTN, HLD. Type 2 DM, CAD s/p CABG x 2 2015, and carotid artery stenosis s/p Right CEA on 2/12 p/w SOB, difficulty swallow and tingling of scalp since 3am this morning.     - no acute surgical intervention   - f/u CTA head and neck   - rec consult neurology for eval swallowing difficulty and scalp paresthesia   - Discussed with Vascular Surgery fellow     Vascular Surgery   [id number]
63 yo LEFT handed man who presents to [facility] with 4 hour onset of SOB. ROS also revealed acute dysphagia w/ flem, horse voice, all which progressively has worsened since waking up from bed around 3am. Patient is s/p right ICA CEA POD# 2; 2/12/20. Patient also noted episodic numbness/tingling in the posterior scalp, but is only triggered with sudden head movements, not constant or progressive in nature. ROS otherwise unremarkable for fever, chills, LOC, dysarthria, chest pain, abdominal pain, N/V, focal weakness, numbness, gait issues. Pertinent hx includes recent CEA for right ICA stenosis, DM2T, HLD, HTN, CAD s/p CABG x2 (10/15/2015), Psoriasis, hx drug abuse (2008), hx of alcohol abuse (2008). NIHSS 1 (left facial) MRS 0.     Exam pertinent for: Speech is not dysarthric, mild horse voice, mild left naso-labial fold flattening, Symmetric palate elevation in midline. Tongue midline. Decreased  strength left (4+/5) vs. right (5/5), patient is LEFT handed. + Babinski of right toe. Mute left toe.     Imaging: CTH read as no acute infarct, mass, or bleed. However, I believe there maybe a hypodense left hemispheric mass superior/posterior to the left lateral ventricle.     Impression: 1) Acute dysphagia (solids + liquids) & horse voice of unclear etiology, less likely central (i.e. brain ischemia) given poor localization of symptoms, however given vascular risk factors and s/p CEA, further evaluation is recommended. Other ddx includes vagus nerve dysfunction (supplies both adductor (vocal cord) and cricopharyngeal (swallowing). Patient reports SOB, which may be related to retained secretions.      2) Mild left nasio-labial fold flattening w/ mild left hand weakness (as per patient is chronic) possibly 2/2 new vs. old right hemispheric dysfunction (r/o ischemia given vascular risk factors)     3)+ Right Babinski w/ incidental left hemispheric hypodensity (superior/posterior to left ventricle) is suspicious and warrants further imaging and evaluation.     Plan:   -MRI brain w/ and w/o contrast  -c/w secondary stroke prevention w/ risk factor control  -c/w home med ASA for now   -NPO including meds pending bedside dysphagia    Plan not discussed w/ attending.

## 2020-02-14 NOTE — ED ADULT NURSE NOTE - OBJECTIVE STATEMENT
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 2 days ago, present to ED for SOB, difficult swallowing and tingling to back of head, pt was discharge yesterday from the hospital, woke up this morning with SOB, which worsen exertion, difficult swallowing and tingling right occipital area, on exam pt A&Ox3, PERRL 3mm, neg facial droop, neg slurred speech, neg arm drift, equal b/l  strength in all four extremities, equal sensation, no vision changes, breathing spontaneous, unlabored w/o distress on room air, lungs b/l  CTA, dressing to right side of neck dry and intact, no redness or swelling noted in side throat, denies dizziness, light headed, weakness, numbness, seen and eval by da FAY placed labs drawn and sent

## 2020-02-14 NOTE — CONSULT NOTE ADULT - SUBJECTIVE AND OBJECTIVE BOX
HPI: 63 yo.....man who presents to [facility] with 4 hour onset of SOB. ROS also revealed acute on chronic dysphagia (has had similar symptoms in the past, but worse today), throat soreness, episodic numbness/tingling in the posterior scalp (positional, onset 3am 2/14/20), perceived swelling proximal to surgical site (s/p right ICA CEA POD# 2; 2/12/20). ROS otherwise unremarkable for......    Pertinent hx includes recent CEA for right ICA stenosis, DM2T, HLD, HTN, CAD s/p CABG x2 (10/15/2015), Psoriasis, hx drug abuse (2008), hx of alcohol abuse (2008)                   Per patient he was recovering well from CEA and discharged yesterday with no issues, tolerating regular diet with no trouble swallowing or sob. He states that once home he went to bed and woke up at 3am and had difficulty catching his breath with "irregular breathing pattern" as well as swallowing phlegm. Breathing episodes lasted <30min, but states he has continued to have minimal trouble swallowing with associated throat soreness and a small change in tone of voice. Denies numbness/tingling, weakness, dizziness/lightheadedness, or drooling. Per pt he has also noted tingling on the back of his scalp 3 times lasting approx 5-10 seconds each time, symptoms noted when rising from laying or sitting. Denies change in mental status, vision changes, HA. Pt able to drink while in ED, although reports difficulty with large volumes of fluid.     (Stroke only)  NIHSS:   MRS:   ICH:     REVIEW OF SYSTEMS  General:	  Skin/Breast:	  Ophthalmologic:  ENMT:	  Respiratory and Thorax:	  Cardiovascular:	  Gastrointestinal:	  Genitourinary:	  Musculoskeletal:	  Neurological:	  Psychiatric:	  Hematology/Lymphatics:	  Endocrine:	  Allergic/Immunologic:	    A 10-system ROS was performed and is negative except for those items noted above and/or in the HPI.    PAST MEDICAL & SURGICAL HISTORY:  hx drug abuse (2008)  hx of alcohol abuse (2008)  Hearing loss: right ear  Hyperopia b/l w/ astigmatism  Presbyopia: right eye  DM2T   right ICA stenosis (60-79%) s/p CEA (Feb 2020)    HLD  HTN  CAD s/p CABG x2 (10/15/2015)   Psoriasis  hx of Hep C s/p Harvoni (2015)  s/p colonoscopy (2019)  hx of umbilical hernia s/p repair (2017)    FAMILY HISTORY:  Family history of cirrhosis of liver (Mother)    SOCIAL HISTORY:   T/E/D:   Occupation:   Lives with:     MEDICATIONS (HOME):  Home Medications:  aspirin 81 mg oral tablet: 1 tab(s) orally once a day (12 Feb 2020 05:49)  atorvastatin 20 mg oral tablet: 1 tab(s) orally once a day (12 Feb 2020 05:49)  metFORMIN 500 mg oral tablet, extended release: 1 tab(s) orally once a day (12 Feb 2020 05:49)  Metoprolol Tartrate 50 mg oral tablet: 1 tab(s) orally 2 times a day (12 Feb 2020 05:49)    MEDICATIONS  (STANDING):    MEDICATIONS  (PRN):    ALLERGIES/INTOLERANCES:  Allergies  penicillin (Hives; Urticaria; Rash)    Intolerances    VITALS & EXAMINATION:  Vital Signs Last 24 Hrs  T(C): 37.1 (14 Feb 2020 10:15), Max: 37.1 (14 Feb 2020 10:15)  T(F): 98.7 (14 Feb 2020 10:15), Max: 98.7 (14 Feb 2020 10:15)  HR: 68 (14 Feb 2020 10:15) (54 - 68)  BP: 148/86 (14 Feb 2020 10:15) (148/86 - 166/77)  BP(mean): --  RR: 16 (14 Feb 2020 10:15) (16 - 18)  SpO2: 96% (14 Feb 2020 10:15) (95% - 99%)    General:  Constitutional: Obese Male, appears stated age, in no apparent distress including pain  Head: Normocephalic & atraumatic.  ENT: Patent ear canals, intact TM, mucus membranes moist & pink, neck supple, no lymphadenopathy.   Respiratory: Patent airway. All lung fields are clear to auscultation bilaterally.  Extremities: No cyanosis, clubbing, or edema.  Skin: No rashes, bruising, or discoloration.    Cardiovascular (>2): RRR no murmurs. Carotid pulsations symmetric, no bruits. Normal capillary beds refill, 1-2 seconds or less.     Neurological (>12):  MS: Awake, alert, oriented to person, place, situation, time. Normal affect. Follows all commands.    Language: Speech is clear, fluent with good repetition & comprehension (able to name objects___)    CNs: PERRLA (R = 3mm, L = 3mm). VFF. EOMI no nystagmus, no diplopia. V1-3 intact to LT/pinprick, well developed masseter muscles b/l. No facial asymmetry b/l, full eye closure strength b/l. Hearing grossly normal (rubbing fingers) b/l. Symmetric palate elevation in midline. Gag reflex deferred. Head turning & shoulder shrug intact b/l. Tongue midline, normal movements, no atrophy.    Fundoscopic: pale w/ sharp discs margins No vascular changes.      Motor: Normal muscle bulk & tone. No noticeable tremor or seizure. No pronator drift.              Deltoid	Biceps	Triceps	Wrist	Finger ABd	   R	5	5	5	5	5		5 	  L	5	5	5	5	5		5    	H-Flex	H-Ext	H-ABd	H-ADd	K-Flex	K-Ext	D-Flex	P-Flex  R	5	5	5	5	5	5	5	5 	   L	5	5	5	5	5	5	5	5	     Sensation: Intact to LT/PP/Temp/Vibration/Position b/l throughout.     Cortical: Extinction on DSS (neglect): none    Reflexes:              Biceps(C5)       BR(C6)     Triceps(C7)               Patellar(L4)    Achilles(S1)    Plantar Resp  R	2	          2	             2		        2		    2		Down   L	2	          2	             2		        2		    2		Down     Coordination: intact rapid-alt movements. No dysmetria to FTN/HTS    Gait: Normal Romberg. No postural instability. Normal stance and tandem gait.     LABORATORY:  CBC                       11.6   7.91  )-----------( 218      ( 14 Feb 2020 08:21 )             37.5     Chem 02-14    140  |  106  |  20  ----------------------------<  97  4.0   |  22  |  0.77    Ca    8.8      14 Feb 2020 08:21  Phos  2.3     02-13  Mg     2.2     02-13    TPro  7.4  /  Alb  4.0  /  TBili  0.8  /  DBili  x   /  AST  34  /  ALT  25  /  AlkPhos  79  02-14    LFTs LIVER FUNCTIONS - ( 14 Feb 2020 08:21 )  Alb: 4.0 g/dL / Pro: 7.4 g/dL / ALK PHOS: 79 U/L / ALT: 25 U/L / AST: 34 U/L / GGT: x           Coagulopathy PT/INR - ( 14 Feb 2020 08:21 )   PT: 12.1 sec;   INR: 1.05 ratio         PTT - ( 14 Feb 2020 08:21 )  PTT:26.7 sec  Lipid Panel 02-13 Chol 101 LDL 57 HDL 34<L> Trig 50  A1c 01-31 [id number] 6.9    Cardiac enzymes     U/A   CSF  Immunological  Other    STUDIES & IMAGING:  Studies (EKG, EEG, EMG, etc):     Radiology (XR, CT, MR, U/S, TTE/RANCHO):    RADIOLOGY & ADDITIONAL STUDIES:  EXAM:  CT ANGIO BRAIN (W)AW IC                          EXAM:  CT BRAIN                          EXAM:  CT ANGIO NECK (W)AW IC                            PROCEDURE DATE:  02/14/2020            INTERPRETATION:  CT angiography of the brain and neck    CLINICAL INDICATION: s/p CEA right side with diff swallowing and  tingling    TECHNIQUE: Direct axial CT scanning of the brain and neck was obtained from the vertex to the level of the clavicular heads after the dynamic intravenous injection of 70 cc of Omnipaque 300. 30 cc were discarded. Sagittal and coronal maximum intensity projection reformats were provided.  Three-dimensional reconstructions were performed by the radiologist using the [employer] workstation.    Additionally, noncontrast axial CT scanning of the brain was obtained from the skull base to the vertex.    COMPARISON: CTA head and neck 2/15/2018.    FINDINGS:     CT BRAIN:    There is no hydrocephalus, mass effect, midline shift, vasogenic edema, or acute intracranial hemorrhage.  There is no CT evidence of acute territorial infarct. Minimal white matter microvascular ischemic disease is noted.    The visualized paranasal sinuses and mastoid air cells are clear.    CTA BRAIN:    There is good flow-related enhancement of the bilateral anterior, middle, and posterior cerebral arteries, and within the basilar artery. The intracranial / skull base internal carotid and intracranial vertebral arteries demonstrate normal flow-related enhancement.     There is no vascular aneurysm or flow-limiting stenosis. No AVM. Anterior communicating artery present. Diminutive right posterior communicating artery is noted. Bilateral P-comm infundibula are present.    CTA NECK:    Interval right carotid endarterectomy.    Approximately 30% short segment stenosis by NASCET criteria of the proximal right internal carotid artery beginning 1.5 cm distal to its origin. Normal flow-related enhancement of the more distal right internal carotid artery.    Noncalcified plaque at the origin of the left internal carotid artery does not result in significant luminal stenosis. Normal flow-related enhancement of the more distal left internal carotid artery.    Normal flow-related enhancement of the bilateral common carotid arteries. The origins of the common carotid arteries are unremarkable.    Origins of the vertebral arteries are not well evaluated. Approximately 55% short segment stenosis by NASCET criteria of the V2 segment of the right vertebral artery at the C6-C7 level.     Otherwise, normal flow-related enhancement of the bilateral vertebral arteries in the neck.    No evidence for arterial dissection.    IMPRESSION:    CT BRAIN:  No acute intracranial hemorrhage, mass effect, vasogenic edema, or evidence of acute territorial infarct.    CTA BRAIN:  No vascular aneurysm or flow-limiting stenosis. No evidence for AVM.    CTA NECK:  Interval right carotid endarterectomy.  Approximately 30% short segment stenosis by NASCET criteria of the proximal right internal carotid artery.  Approximately 55% short segment stenosis of the V2 segment of the right vertebral artery at the C6-C7 level HPI: 65 yo LEFT handed man who presents to [facility] with 4 hour onset of SOB. ROS also revealed acute dysphagia w/ flem, throat soreness, episodic numbness/tingling in the posterior scalp (positional, onset 3am 2/14/20), perceived swelling proximal to surgical site (s/p right ICA CEA POD# 2; 2/12/20). ROS otherwise unremarkable for......    Pertinent hx includes recent CEA for right ICA stenosis, DM2T, HLD, HTN, CAD s/p CABG x2 (10/15/2015), Psoriasis, hx drug abuse (2008), hx of alcohol abuse (2008)                   Per patient he was recovering well from CEA and discharged yesterday with no issues, tolerating regular diet with no trouble swallowing or sob. He states that once home he went to bed and woke up at 3am and had difficulty catching his breath with "irregular breathing pattern" as well as swallowing phlegm. Breathing episodes lasted <30min, but states he has continued to have minimal trouble swallowing with associated throat soreness and a small change in tone of voice. Denies numbness/tingling, weakness, dizziness/lightheadedness, or drooling. Per pt he has also noted tingling on the back of his scalp 3 times lasting approx 5-10 seconds each time, symptoms noted when rising from laying or sitting. Denies change in mental status, vision changes, HA. Pt able to drink while in ED, although reports difficulty with large volumes of fluid.     (Stroke only)  NIHSS:   MRS:   ICH:     REVIEW OF SYSTEMS  General:	  Skin/Breast:	  Ophthalmologic:  ENMT:	  Respiratory and Thorax:	  Cardiovascular:	  Gastrointestinal:	  Genitourinary:	  Musculoskeletal:	  Neurological:	  Psychiatric:	  Hematology/Lymphatics:	  Endocrine:	  Allergic/Immunologic:	    A 10-system ROS was performed and is negative except for those items noted above and/or in the HPI.    PAST MEDICAL & SURGICAL HISTORY:  hx drug abuse (2008)  hx of alcohol abuse (2008)  Hearing loss: right ear  Hyperopia b/l w/ astigmatism  Presbyopia: right eye  DM2T   right ICA stenosis (60-79%) s/p CEA (Feb 2020)    HLD  HTN  CAD s/p CABG x2 (10/15/2015)   Psoriasis  hx of Hep C s/p Harvoni (2015)  s/p colonoscopy (2019)  hx of umbilical hernia s/p repair (2017)    FAMILY HISTORY:  Family history of cirrhosis of liver (Mother)    SOCIAL HISTORY:   T/E/D:   Occupation:   Lives with:     MEDICATIONS (HOME):  Home Medications:  aspirin 81 mg oral tablet: 1 tab(s) orally once a day (12 Feb 2020 05:49)  atorvastatin 20 mg oral tablet: 1 tab(s) orally once a day (12 Feb 2020 05:49)  metFORMIN 500 mg oral tablet, extended release: 1 tab(s) orally once a day (12 Feb 2020 05:49)  Metoprolol Tartrate 50 mg oral tablet: 1 tab(s) orally 2 times a day (12 Feb 2020 05:49)    MEDICATIONS  (STANDING):    MEDICATIONS  (PRN):    ALLERGIES/INTOLERANCES:  Allergies  penicillin (Hives; Urticaria; Rash)    Intolerances    VITALS & EXAMINATION:  Vital Signs Last 24 Hrs  T(C): 37.1 (14 Feb 2020 10:15), Max: 37.1 (14 Feb 2020 10:15)  T(F): 98.7 (14 Feb 2020 10:15), Max: 98.7 (14 Feb 2020 10:15)  HR: 68 (14 Feb 2020 10:15) (54 - 68)  BP: 148/86 (14 Feb 2020 10:15) (148/86 - 166/77)  BP(mean): --  RR: 16 (14 Feb 2020 10:15) (16 - 18)  SpO2: 96% (14 Feb 2020 10:15) (95% - 99%)    General:  Constitutional: Obese Male, appears stated age, in no apparent distress including pain  Head: Normocephalic & atraumatic.  ENT: Patent ear canals, intact TM, mucus membranes moist & pink, neck supple, no lymphadenopathy.   Respiratory: Patent airway. All lung fields are clear to auscultation bilaterally.  Extremities: No cyanosis, clubbing, or edema.  Skin: No rashes, bruising, or discoloration.    Cardiovascular (>2): RRR no murmurs. Carotid pulsations symmetric, no bruits. Normal capillary beds refill, 1-2 seconds or less.     Neurological (>12):  MS: Awake, alert, oriented to person, place, situation, time. Normal affect. Follows all commands.    Language: Speech is clear, fluent with good repetition & comprehension (able to name objects___)    CNs: PERRLA (R = 3mm, L = 3mm). VFF. EOMI no nystagmus, no diplopia. V1-3 intact to LT/pinprick, well developed masseter muscles b/l. No facial asymmetry b/l, full eye closure strength b/l. Hearing grossly normal (rubbing fingers) b/l. Symmetric palate elevation in midline. Gag reflex deferred. Head turning & shoulder shrug intact b/l. Tongue midline, normal movements, no atrophy.    Fundoscopic: pale w/ sharp discs margins No vascular changes.      Motor: Normal muscle bulk & tone. No noticeable tremor or seizure. No pronator drift.              Deltoid	Biceps	Triceps	Wrist	Finger ABd	   R	5	5	5	5	5		5 	  L	5	5	5	5	5		5    	H-Flex	H-Ext	H-ABd	H-ADd	K-Flex	K-Ext	D-Flex	P-Flex  R	5	5	5	5	5	5	5	5 	   L	5	5	5	5	5	5	5	5	     Sensation: Intact to LT/PP/Temp/Vibration/Position b/l throughout.     Cortical: Extinction on DSS (neglect): none    Reflexes:              Biceps(C5)       BR(C6)     Triceps(C7)               Patellar(L4)    Achilles(S1)    Plantar Resp  R	2	          2	             2		        2		    2		Down   L	2	          2	             2		        2		    2		Down     Coordination: intact rapid-alt movements. No dysmetria to FTN/HTS    Gait: Normal Romberg. No postural instability. Normal stance and tandem gait.     LABORATORY:  CBC                       11.6   7.91  )-----------( 218      ( 14 Feb 2020 08:21 )             37.5     Chem 02-14    140  |  106  |  20  ----------------------------<  97  4.0   |  22  |  0.77    Ca    8.8      14 Feb 2020 08:21  Phos  2.3     02-13  Mg     2.2     02-13    TPro  7.4  /  Alb  4.0  /  TBili  0.8  /  DBili  x   /  AST  34  /  ALT  25  /  AlkPhos  79  02-14    LFTs LIVER FUNCTIONS - ( 14 Feb 2020 08:21 )  Alb: 4.0 g/dL / Pro: 7.4 g/dL / ALK PHOS: 79 U/L / ALT: 25 U/L / AST: 34 U/L / GGT: x           Coagulopathy PT/INR - ( 14 Feb 2020 08:21 )   PT: 12.1 sec;   INR: 1.05 ratio         PTT - ( 14 Feb 2020 08:21 )  PTT:26.7 sec  Lipid Panel 02-13 Chol 101 LDL 57 HDL 34<L> Trig 50  A1c 01-31 [id number] 6.9    Cardiac enzymes     U/A   CSF  Immunological  Other    STUDIES & IMAGING:  Studies (EKG, EEG, EMG, etc):     Radiology (XR, CT, MR, U/S, TTE/RANCHO):    RADIOLOGY & ADDITIONAL STUDIES:  EXAM:  CT ANGIO BRAIN (W)AW IC                          EXAM:  CT BRAIN                          EXAM:  CT ANGIO NECK (W)AW IC                            PROCEDURE DATE:  02/14/2020            INTERPRETATION:  CT angiography of the brain and neck    CLINICAL INDICATION: s/p CEA right side with diff swallowing and  tingling    TECHNIQUE: Direct axial CT scanning of the brain and neck was obtained from the vertex to the level of the clavicular heads after the dynamic intravenous injection of 70 cc of Omnipaque 300. 30 cc were discarded. Sagittal and coronal maximum intensity projection reformats were provided.  Three-dimensional reconstructions were performed by the radiologist using the [employer] workstation.    Additionally, noncontrast axial CT scanning of the brain was obtained from the skull base to the vertex.    COMPARISON: CTA head and neck 2/15/2018.    FINDINGS:     CT BRAIN:    There is no hydrocephalus, mass effect, midline shift, vasogenic edema, or acute intracranial hemorrhage.  There is no CT evidence of acute territorial infarct. Minimal white matter microvascular ischemic disease is noted.    The visualized paranasal sinuses and mastoid air cells are clear.    CTA BRAIN:    There is good flow-related enhancement of the bilateral anterior, middle, and posterior cerebral arteries, and within the basilar artery. The intracranial / skull base internal carotid and intracranial vertebral arteries demonstrate normal flow-related enhancement.     There is no vascular aneurysm or flow-limiting stenosis. No AVM. Anterior communicating artery present. Diminutive right posterior communicating artery is noted. Bilateral P-comm infundibula are present.    CTA NECK:    Interval right carotid endarterectomy.    Approximately 30% short segment stenosis by NASCET criteria of the proximal right internal carotid artery beginning 1.5 cm distal to its origin. Normal flow-related enhancement of the more distal right internal carotid artery.    Noncalcified plaque at the origin of the left internal carotid artery does not result in significant luminal stenosis. Normal flow-related enhancement of the more distal left internal carotid artery.    Normal flow-related enhancement of the bilateral common carotid arteries. The origins of the common carotid arteries are unremarkable.    Origins of the vertebral arteries are not well evaluated. Approximately 55% short segment stenosis by NASCET criteria of the V2 segment of the right vertebral artery at the C6-C7 level.     Otherwise, normal flow-related enhancement of the bilateral vertebral arteries in the neck.    No evidence for arterial dissection.    IMPRESSION:    CT BRAIN:  No acute intracranial hemorrhage, mass effect, vasogenic edema, or evidence of acute territorial infarct.    CTA BRAIN:  No vascular aneurysm or flow-limiting stenosis. No evidence for AVM.    CTA NECK:  Interval right carotid endarterectomy.  Approximately 30% short segment stenosis by NASCET criteria of the proximal right internal carotid artery.  Approximately 55% short segment stenosis of the V2 segment of the right vertebral artery at the C6-C7 level HPI: 65 yo LEFT handed man who presents to [facility] with 4 hour onset of SOB. ROS also revealed acute dysphagia w/ flem, horse voice, all which progressively has worsened since waking up from bed around 3am. Patient is s/p right ICA CEA POD# 2; 2/12/20. Patient also noted episodic numbness/tingling in the posterior scalp, but is only triggered with sudden head movements, not constant or progressive in nature. ROS otherwise unremarkable for fever, chills, LOC, dysarthria, chest pain, abdominal pain, N/V, focal weakness, numbness, gait issues. Pertinent hx includes recent CEA for right ICA stenosis, DM2T, HLD, HTN, CAD s/p CABG x2 (10/15/2015), Psoriasis, hx drug abuse (2008), hx of alcohol abuse (2008). NIHSS 1 (left facial) MRS 0.       (Stroke only)  NIHSS: 1  MRS: 0  ICH: N/A     REVIEW OF SYSTEMS  General:	  Skin/Breast:	  Ophthalmologic:  ENMT:	  Respiratory and Thorax:	  Cardiovascular:	  Gastrointestinal:	  Genitourinary:	  Musculoskeletal:	  Neurological:	  Psychiatric:	  Hematology/Lymphatics:	  Endocrine:	  Allergic/Immunologic:	    A 10-system ROS was performed and is negative except for those items noted above and/or in the HPI.    PAST MEDICAL & SURGICAL HISTORY:  hx drug abuse (2008)  hx of alcohol abuse (2008)  Hearing loss: right ear  Hyperopia b/l w/ astigmatism  Presbyopia: right eye  DM2T   right ICA stenosis (60-79%) s/p CEA (Feb 2020)    HLD  HTN  CAD s/p CABG x2 (10/15/2015)   Psoriasis  hx of Hep C s/p Harvoni (2015)  s/p colonoscopy (2019)  hx of umbilical hernia s/p repair (2017)    FAMILY HISTORY:  Family history of cirrhosis of liver (Mother)    SOCIAL HISTORY:   T/E/D:   Occupation:   Lives with:     MEDICATIONS (HOME):  Home Medications:  aspirin 81 mg oral tablet: 1 tab(s) orally once a day (12 Feb 2020 05:49)  atorvastatin 20 mg oral tablet: 1 tab(s) orally once a day (12 Feb 2020 05:49)  metFORMIN 500 mg oral tablet, extended release: 1 tab(s) orally once a day (12 Feb 2020 05:49)  Metoprolol Tartrate 50 mg oral tablet: 1 tab(s) orally 2 times a day (12 Feb 2020 05:49)    MEDICATIONS  (STANDING):    MEDICATIONS  (PRN):    ALLERGIES/INTOLERANCES:  Allergies  penicillin (Hives; Urticaria; Rash)    Intolerances    VITALS & EXAMINATION:  Vital Signs Last 24 Hrs  T(C): 37.1 (14 Feb 2020 10:15), Max: 37.1 (14 Feb 2020 10:15)  T(F): 98.7 (14 Feb 2020 10:15), Max: 98.7 (14 Feb 2020 10:15)  HR: 68 (14 Feb 2020 10:15) (54 - 68)  BP: 148/86 (14 Feb 2020 10:15) (148/86 - 166/77)  RR: 16 (14 Feb 2020 10:15) (16 - 18)  SpO2: 96% (14 Feb 2020 10:15) (95% - 99%)    General:  Constitutional:  Male, appears stated age, in no apparent distress including pain  Head: Normocephalic & atraumatic.  ENT: neck supple, no lymphadenopathy.   Respiratory: Patent airway. .    Cardiovascular (>2): Normal capillary beds refill, 1-2 seconds or less.     Neurological (>12):  MS: Awake, alert, oriented to person, place, situation, time. Normal affect. Follows all commands.    Language: Speech is not dysarthric, mild horse voice, fluent with comprehension (able to name objects)    CNs: PERRLA (R = 3mm, L = 3mm). VFF. EOMI no nystagmus, no diplopia. V1-3 intact to LT, mild left naso-labial fold flattening, full eye closure strength b/l. Hearing grossly normal b/l. Symmetric palate elevation in midline. Gag reflex deferred. Tongue midline, normal movements, no atrophy.    Fundoscopic: deferred       Motor: Normal muscle bulk & tone. No noticeable tremor. No pronator drift.              Deltoid	Biceps	Triceps	Wrist		   R	5	5	5	5		5	  L	5	5	5	5		4+    	H-Flex	H-Ext	H-ABd	H-ADd	K-Flex	K-Ext	D-Flex	P-Flex  R	5	5	5	5	5	5	5	5 	   L	5	5	5	5	5	5	5	5	     Sensation: Intact to LT    Cortical: Extinction on DSS (neglect): none    Reflexes:              Plantar Resp  R	Upgoing   L	mute     Coordination: No dysmetria to FTN    Gait: deferred     LABORATORY:  CBC                       11.6   7.91  )-----------( 218      ( 14 Feb 2020 08:21 )             37.5     Chem 02-14    140  |  106  |  20  ----------------------------<  97  4.0   |  22  |  0.77    Ca    8.8      14 Feb 2020 08:21  Phos  2.3     02-13  Mg     2.2     02-13    TPro  7.4  /  Alb  4.0  /  TBili  0.8  /  DBili  x   /  AST  34  /  ALT  25  /  AlkPhos  79  02-14    LFTs LIVER FUNCTIONS - ( 14 Feb 2020 08:21 )  Alb: 4.0 g/dL / Pro: 7.4 g/dL / ALK PHOS: 79 U/L / ALT: 25 U/L / AST: 34 U/L / GGT: x           Coagulopathy PT/INR - ( 14 Feb 2020 08:21 )   PT: 12.1 sec;   INR: 1.05 ratio         PTT - ( 14 Feb 2020 08:21 )  PTT:26.7 sec  Lipid Panel 02-13 Chol 101 LDL 57 HDL 34<L> Trig 50  A1c 01-31 [id number] 6.9    Cardiac enzymes     U/A   CSF  Immunological  Other    STUDIES & IMAGING:  Studies (EKG, EEG, EMG, etc):     Radiology (XR, CT, MR, U/S, TTE/RANCHO):    RADIOLOGY & ADDITIONAL STUDIES:  EXAM:  CT ANGIO BRAIN (W)AW IC                          EXAM:  CT BRAIN                          EXAM:  CT ANGIO NECK (W)AW IC                            PROCEDURE DATE:  02/14/2020            INTERPRETATION:  CT angiography of the brain and neck    CLINICAL INDICATION: s/p CEA right side with diff swallowing and  tingling    TECHNIQUE: Direct axial CT scanning of the brain and neck was obtained from the vertex to the level of the clavicular heads after the dynamic intravenous injection of 70 cc of Omnipaque 300. 30 cc were discarded. Sagittal and coronal maximum intensity projection reformats were provided.  Three-dimensional reconstructions were performed by the radiologist using the [employer] workstation.    Additionally, noncontrast axial CT scanning of the brain was obtained from the skull base to the vertex.    COMPARISON: CTA head and neck 2/15/2018.    FINDINGS:     CT BRAIN:    There is no hydrocephalus, mass effect, midline shift, vasogenic edema, or acute intracranial hemorrhage.  There is no CT evidence of acute territorial infarct. Minimal white matter microvascular ischemic disease is noted.    The visualized paranasal sinuses and mastoid air cells are clear.    CTA BRAIN:    There is good flow-related enhancement of the bilateral anterior, middle, and posterior cerebral arteries, and within the basilar artery. The intracranial / skull base internal carotid and intracranial vertebral arteries demonstrate normal flow-related enhancement.     There is no vascular aneurysm or flow-limiting stenosis. No AVM. Anterior communicating artery present. Diminutive right posterior communicating artery is noted. Bilateral P-comm infundibula are present.    CTA NECK:    Interval right carotid endarterectomy.    Approximately 30% short segment stenosis by NASCET criteria of the proximal right internal carotid artery beginning 1.5 cm distal to its origin. Normal flow-related enhancement of the more distal right internal carotid artery.    Noncalcified plaque at the origin of the left internal carotid artery does not result in significant luminal stenosis. Normal flow-related enhancement of the more distal left internal carotid artery.    Normal flow-related enhancement of the bilateral common carotid arteries. The origins of the common carotid arteries are unremarkable.    Origins of the vertebral arteries are not well evaluated. Approximately 55% short segment stenosis by NASCET criteria of the V2 segment of the right vertebral artery at the C6-C7 level.     Otherwise, normal flow-related enhancement of the bilateral vertebral arteries in the neck.    No evidence for arterial dissection.    IMPRESSION:    CT BRAIN:  No acute intracranial hemorrhage, mass effect, vasogenic edema, or evidence of acute territorial infarct.    CTA BRAIN:  No vascular aneurysm or flow-limiting stenosis. No evidence for AVM.    CTA NECK:  Interval right carotid endarterectomy.  Approximately 30% short segment stenosis by NASCET criteria of the proximal right internal carotid artery.  Approximately 55% short segment stenosis of the V2 segment of the right vertebral artery at the C6-C7 level

## 2020-02-14 NOTE — ED PROVIDER NOTE - ATTENDING CONTRIBUTION TO CARE
2 days ago endarterectomy. exertional sob. R side of head tingles. trouble swallowing w some pain but no prob w secretions.   throat nl. Neck symmetrical. ok gait. nl neuro exam  consider post circ cva. consider cva. consider complication of procedure as well as intubation. s/s and timeline not likely pe right now. ekg / xr chest / ct/cta/ labs / vasc eval.

## 2020-02-14 NOTE — ED PROVIDER NOTE - PSH
H/O colonoscopy  2019  H/O coronary artery bypass surgery  CABG x 2 -10/15/2015  History of umbilical hernia repair  2017

## 2020-02-14 NOTE — ED CDU PROVIDER INITIAL DAY NOTE - OBJECTIVE STATEMENT
63 yo male with PMH HTN, HLD, Type 2 DM, CAD s/p CABG x 2 2015, Hepatitis C (treated with Harvoni 2015, cured) and carotid artery stenosis s/p Right CEA on 2/12/2020 p/w SOB, difficulty swallowing, hoarse voice, and tingling in the back of his head on the right x 4 hrs. pt state that he feels like the site of the surgery feels swollen. Pt states that he has had difficulty swallowing since the surgery, but today was worst, though pt is able to swallow and tolerate PO intake. pt also notes he has not had a BM since the surgery, but is passing gas. Denies fevers, chills, cough, weakness, visual changes, problems walking, H/A, CP, abd pain, back pain, rash, swelling, or other complaints.  In ED, labs unremarkable, CTH negative, CTA head negative, CTA neck showed 30% stenosis R ICA (improved) and 55% stenosis of R vertebral artery. neuro saw pt, reviewed CTH and feel there is a hypodense L hemispheric mass, recommend pt go to CDU for continued monitoring and work up with MRI.     PMD Dr. Shreya Argueta  cardio Dr. Esteban Da Silva  no neuro

## 2020-02-14 NOTE — ED CDU PROVIDER INITIAL DAY NOTE - MEDICAL DECISION MAKING DETAILS
s/p R carotid endarterectomy with vague neuro sympt and CT head with poss hypodensity - Neuro recommending MRI of brain to further evaluate.  Neuro checks and reassess.

## 2020-02-14 NOTE — ED ADULT NURSE NOTE - NS ED NURSE DC INFO COMPLEXITY
Returned Demonstration/Patient asked questions/Simple: Patient demonstrates quick and easy understanding

## 2020-02-15 VITALS
DIASTOLIC BLOOD PRESSURE: 81 MMHG | OXYGEN SATURATION: 98 % | HEART RATE: 52 BPM | SYSTOLIC BLOOD PRESSURE: 152 MMHG | RESPIRATION RATE: 18 BRPM | TEMPERATURE: 98 F

## 2020-02-15 PROCEDURE — 71046 X-RAY EXAM CHEST 2 VIEWS: CPT

## 2020-02-15 PROCEDURE — 85027 COMPLETE CBC AUTOMATED: CPT

## 2020-02-15 PROCEDURE — 85610 PROTHROMBIN TIME: CPT

## 2020-02-15 PROCEDURE — 99284 EMERGENCY DEPT VISIT MOD MDM: CPT | Mod: 25

## 2020-02-15 PROCEDURE — 70496 CT ANGIOGRAPHY HEAD: CPT

## 2020-02-15 PROCEDURE — 80053 COMPREHEN METABOLIC PANEL: CPT

## 2020-02-15 PROCEDURE — 93005 ELECTROCARDIOGRAM TRACING: CPT

## 2020-02-15 PROCEDURE — 70553 MRI BRAIN STEM W/O & W/DYE: CPT

## 2020-02-15 PROCEDURE — 99217: CPT

## 2020-02-15 PROCEDURE — 70450 CT HEAD/BRAIN W/O DYE: CPT

## 2020-02-15 PROCEDURE — 84484 ASSAY OF TROPONIN QUANT: CPT

## 2020-02-15 PROCEDURE — 70498 CT ANGIOGRAPHY NECK: CPT

## 2020-02-15 PROCEDURE — 70553 MRI BRAIN STEM W/O & W/DYE: CPT | Mod: 26

## 2020-02-15 PROCEDURE — 85730 THROMBOPLASTIN TIME PARTIAL: CPT

## 2020-02-15 PROCEDURE — 82962 GLUCOSE BLOOD TEST: CPT

## 2020-02-15 PROCEDURE — G0378: CPT

## 2020-02-15 RX ADMIN — SENNA PLUS 2 TABLET(S): 8.6 TABLET ORAL at 00:17

## 2020-02-15 NOTE — ED CDU PROVIDER DISPOSITION NOTE - CARE PROVIDER_API CALL
Liu Fink)  Vascular Surgery  [address] [facility], [address]  [place] [address]  Phone: [phone]  Fax: [phone]  Follow Up Time: 1-3 Days

## 2020-02-15 NOTE — ED CDU PROVIDER DISPOSITION NOTE - CLINICAL COURSE
65 yo male with PMH HTN, HLD, Type 2 DM, CAD s/p CABG x 2 2015, Hepatitis C (treated with Harvoni 2015, cured) and carotid artery stenosis s/p Right CEA on 2/12/2020 p/w SOB, difficulty swallowing, hoarse voice, and tingling in the back of his head on the right x 4 hrs. pt state that he feels like the site of the surgery feels swollen. Pt states that he has had difficulty swallowing since the surgery, but today was worst, though pt is able to swallow and tolerate PO intake. pt also notes he has not had a BM since the surgery, but is passing gas. Denies fevers, chills, cough, weakness, visual changes, problems walking, H/A, CP, abd pain, back pain, rash, swelling, or other complaints.  In ED, labs unremarkable, CTH negative, CTA head negative, CTA neck showed 30% stenosis R ICA (improved) and 55% stenosis of R vertebral artery. neuro saw pt, reviewed CTH and feel there is a hypodense L hemispheric mass, recommend pt go to CDU for continued monitoring and work up with MRI.  In CDU, __________ 63 yo male with PMH HTN, HLD, Type 2 DM, CAD s/p CABG x 2 2015, Hepatitis C (treated with Harvoni 2015, cured) and carotid artery stenosis s/p Right CEA on 2/12/2020 p/w SOB, difficulty swallowing, hoarse voice, and tingling in the back of his head on the right x 4 hrs. pt state that he feels like the site of the surgery feels swollen. Pt states that he has had difficulty swallowing since the surgery, but today was worst, though pt is able to swallow and tolerate PO intake. pt also notes he has not had a BM since the surgery, but is passing gas. Denies fevers, chills, cough, weakness, visual changes, problems walking, H/A, CP, abd pain, back pain, rash, swelling, or other complaints.  In ED, labs unremarkable, CTH negative, CTA head negative, CTA neck showed 30% stenosis R ICA (improved) and 55% stenosis of R vertebral artery. neuro saw pt, reviewed CTH and feel there is a hypodense L hemispheric mass, recommend pt go to CDU for continued monitoring and work up with MRI.  In CDU, no events on tele. Patient tolerating PO soft diet. MRI unremarkable. Cleared by neuro and vascular for d/c home and outpatient f/u.

## 2020-02-15 NOTE — ED CDU PROVIDER SUBSEQUENT DAY NOTE - MEDICAL DECISION MAKING DETAILS
/p R carotid endarterectomy with vague neuro sympt and CT head with poss hypodensity - Neuro recommending MRI of brain to further evaluate.  Neuro checks and reassess.

## 2020-02-15 NOTE — ED CDU PROVIDER SUBSEQUENT DAY NOTE - RESPIRATORY, MLM
Breath sounds clear and equal bilaterally. How Severe Is Your Skin Lesion?: mild Has Your Skin Lesion Been Treated?: not been treated Is This A New Presentation, Or A Follow-Up?: Growth

## 2020-02-15 NOTE — ED CDU PROVIDER SUBSEQUENT DAY NOTE - HISTORY
No interval changes since initial CDU provider note. Pt feels well. pt tolerated PO intake with yogart. notes a little throat discomfort, but denies any SOB or other complaints. NAD VSS. no events on tele. neuro exam normal, no deficits. neuro following. pt to get MRI head in the morning. will continue monitoring. - LILIANA Nieto No interval changes since initial CDU provider note. Pt feels well. pt tolerated PO intake with yogart. notes a little throat discomfort, but denies any SOB or other complaints. NAD VSS. sinus sammi on tele. neuro exam normal, no deficits. neuro following. pt to get MRI head in the morning. will continue monitoring. - LILIANA Nieto No interval changes since initial CDU provider note. Pt feels well. pt tolerated PO intake with yogart. notes a little throat discomfort, but denies any SOB or other complaints. NAD VSS. sinus sammi on tele. neuro exam normal for pt, + mild L facial droop (baseline) and 4+ strength LUE (baseline) no other deficits. neuro following. pt to get MRI head in the morning. will continue monitoring. Vick Nieto

## 2020-02-15 NOTE — ED CDU PROVIDER DISPOSITION NOTE - NSFOLLOWUPCLINICS_GEN_ALL_ED_FT
[facility]  Neurology  [address] [place] [address] 3[address] [place] [address]  Phone: [phone]  Fax:   Follow Up Time: 1-3 Days

## 2020-02-15 NOTE — ED CDU PROVIDER DISPOSITION NOTE - PATIENT PORTAL LINK FT
You can access the FollowMyHealth Patient Portal offered by [facility] by registering at the following website: [email or web address]. By joining [facility]’s FollowMyHealth portal, you will also be able to view your health information using other applications (apps) compatible with our system.

## 2020-02-15 NOTE — ED ADULT NURSE REASSESSMENT NOTE - NS ED NURSE REASSESS COMMENT FT1
14.00 Pt was evaluated by  CDU MD David Lutz  pt is  feeling better Discharged ML out PA  Deborah Akbar explained the follow up care & gave the discharge summary  Pt verbalized the understanding on follow up care stable to go home pt has stable vitals steady gait A&OX 2 at the time of discharge
Pt c/o difficulty swallowing, pt describes it as like having phlegm stuck in his throat pt states it better than it was before. Pt neuro check intact, no deficits noted.
Pt c/o difficulty swallowing, pt describes it as like having phlegm stuck in his throat. Pt neuro check intact, no deficits noted.
Received report from Kuldip FOSTER. Pt. A&OX3, sitting up in stretcher, resting comfortably, pt. does not appear to be in any acute distress - nonlabored breathing noted and pt. is speaking in full coherent sentences. Pt. is awaiting to be evaluated by the CDU. Safety and comfort provided. VSS/NAD. Family at bedside.
pt remain stable in ED, A&Ox3, breathing spontaneous, unlabored w/o distress on room air, NAD, awaits disp
pt remain in ED, A&Ox3, breathing spontaneous, unlabored w/o distress on room air, NAD, s/p CT scan and vacular consult, awaits neuro consult and dispo
Pt received from CRISTIAN Anaya. Pt oriented to CDU & plan of care was discussed. Pt A&O x 3. Pt in CDU for MRI in am, cardiac monitoring, and neuro checks q 4. Pt c/o difficulty swallowing, pt describes it as like having phlegm stuck in his throat. Pt denies any SOB, headache, lightheadedness, or dizziness. Pt on a cardiac monitor in Sinus sammi, HR in 50's. Pt neuro check intact, no deficits noted. Pt MRI checklist filled out and faxed to MRI. Pt resting in bed. Safety & comfort measures maintained. Call bell in reach. Will continue to monitor.
07.00 Am Received report from CRISTIAN Gould  pt is observed for dysphagia S/P Carotid  artery stenosis repair  Pt is  Awaiting for MRI .Pt is A&OX 4 steady gait  Neuro mentation intact GCS 15/15 PEARLA . Pt has No fever chills cp sob N/V/D headache dizziness .  Comfort care & safety measures initiated  IV site looks clean & dry  no signs of infiltration noted  Pt is advised to call for help if needed call bell with in the reach   Pt verbalized the understanding. Pt was able to eat yogurt soft pancakes  no drooling/no cough  noted continue to monitor .  09.00 Am Pt went for MRI

## 2020-02-15 NOTE — ED CDU PROVIDER SUBSEQUENT DAY NOTE - PROGRESS NOTE DETAILS
CDU NOTE LILIANA VALDES: Pt resting comfortably, feeling well without complaint. no dysphagia, no sore throat, no paresthesias of posterior head. NAD, VSS. sinus sammi on telemetry. neuro exam unchanged, + mild L facial droop (baseline) and 4+ strength LUE (baseline) no other deficits. will continue monitoring. Patient seen and evaluated at bedside, resting comfortably NAD. Pt reports persistent mild throat soreness without any trouble swallowing. Also reports R occipital head numbness only when looking over L shoulder. Denies any HA, neck pain, dizziness, vision changes, change in speech, any other numbness/tingling, weakness, trouble walking. VSS. Neuro exam unremarkable, steady gait. No events on telemetry, HR 40-50 bpm. Plan for MRI today. Patient tolerating soft diet this morning. Patient seen by neurology, MRI reviewed with no acute changes, recommending d/c home to f/u with his vascular surgeon. Also recommending PCP f/u for speech/swallow referral. Per vascular note, no acute interventions at this time. Pending MRI final report.   VSS. No events on tele. Neuro exam remains nonfocal. MRI unremarkable. D/w Dr. Hernandez. Pt stable for d/c home. MRI unremarkable. D/w Dr. Hernandez. Pt stable for d/c home. Spoke with vascular, pt to f/u with Dr. Fink. pt feeling much better with no numbness or weakness and only feeling paresthesia with certain movement of neck. MRI unremarkable.  pt cleared by Neuro and vascular surgery.  pt will f/u with both in the next week.  pt is stable for d/c.

## 2020-02-20 LAB — SURGICAL PATHOLOGY STUDY: SIGNIFICANT CHANGE UP

## 2020-02-27 ENCOUNTER — APPOINTMENT (OUTPATIENT)
Dept: VASCULAR SURGERY | Facility: CLINIC | Age: 65
End: 2020-02-27
Payer: COMMERCIAL

## 2020-02-27 PROCEDURE — 99024 POSTOP FOLLOW-UP VISIT: CPT

## 2020-02-27 NOTE — REASON FOR VISIT
[de-identified] : right CEA [de-identified] : Patient is status post right carotid endarterectomy. No complaints. Neurologically intact.

## 2020-02-27 NOTE — DISCUSSION/SUMMARY
[FreeTextEntry1] : Patient is doing well. No intervention necessary at this time. Followup 6 weeks. With duplex

## 2020-02-27 NOTE — PHYSICAL EXAM
[JVD] : no jugular venous distention  [de-identified] : appears well [de-identified] : well-healed

## 2020-04-07 ENCOUNTER — APPOINTMENT (OUTPATIENT)
Dept: VASCULAR SURGERY | Facility: CLINIC | Age: 65
End: 2020-04-07

## 2021-10-08 ENCOUNTER — TRANSCRIPTION ENCOUNTER (OUTPATIENT)
Age: 66
End: 2021-10-08

## 2021-11-12 ENCOUNTER — TRANSCRIPTION ENCOUNTER (OUTPATIENT)
Age: 66
End: 2021-11-12

## 2021-11-30 NOTE — ASU PREOP CHECKLIST - IV STARTED
Left message for Pt to return call to inform of Dr Gonzales results message below.      Component      Latest Ref Rng & Units 11/24/2021 11/24/2021           9:17 AM  9:17 AM   Fasting Status      0 - 999 Hours 12 12   Sodium      135 - 145 mmol/L  138   Potassium      3.4 - 5.1 mmol/L  4.7   Chloride      98 - 107 mmol/L  105   CO2      21 - 32 mmol/L  31   ANION GAP      10 - 20 mmol/L  7 (L)   Glucose      70 - 99 mg/dL  92   BUN      6 - 20 mg/dL  17   Creatinine      0.67 - 1.17 mg/dL  0.99   Glomerular Filtration Rate      >=60  81   BUN/CREATININE RATIO      7 - 25  17   CALCIUM      8.4 - 10.2 mg/dL  9.3   TOTAL BILIRUBIN      0.2 - 1.0 mg/dL  0.4   AST/SGOT      <=37 Units/L  25   ALT/SGPT      <64 Units/L  46   ALK PHOSPHATASE      45 - 117 Units/L  77   Albumin      3.6 - 5.1 g/dL  3.9   TOTAL PROTEIN      6.4 - 8.2 g/dL  7.3   GLOBULIN      2.0 - 4.0 g/dL  3.4   A/G Ratio, Serum      1.0 - 2.4  1.1   CHOLESTEROL      <=199 mg/dL 142    TRIGLYCERIDE      <=149 mg/dL 112    HDL      >=40 mg/dL 55    CALCULATED LDL      <=129 mg/dL 65    CALCULATED NON HDL      mg/dL 87    CHOL/HDL      <=4.4 2.6    GLYCOHEMOGLOBIN A1C      4.5 - 5.6 % 5.4    PSA, Total      <=4.50 ng/mL 2.29         yes

## 2022-01-20 NOTE — ED ADULT NURSE NOTE - STOOL DESCRIPTION
bright red Azathioprine Pregnancy And Lactation Text: This medication is Pregnancy Category D and isn't considered safe during pregnancy. It is unknown if this medication is excreted in breast milk.

## 2022-08-02 NOTE — H&P PST ADULT - OPH GEN HX ROS MEA POS PC
Suturegard Retention Suture: 2-0 Nylon blurred vision R/blind right eye blurred vision R/loss of vision R/Hx Hyperopia

## 2022-11-29 ENCOUNTER — APPOINTMENT (OUTPATIENT)
Dept: VASCULAR SURGERY | Facility: CLINIC | Age: 67
End: 2022-11-29
Payer: COMMERCIAL

## 2022-11-29 ENCOUNTER — APPOINTMENT (OUTPATIENT)
Dept: VASCULAR SURGERY | Facility: CLINIC | Age: 67
End: 2022-11-29

## 2022-11-29 VITALS
DIASTOLIC BLOOD PRESSURE: 78 MMHG | HEIGHT: 64 IN | BODY MASS INDEX: 24.92 KG/M2 | HEART RATE: 55 BPM | SYSTOLIC BLOOD PRESSURE: 154 MMHG | WEIGHT: 146 LBS

## 2022-11-29 PROCEDURE — 93880 EXTRACRANIAL BILAT STUDY: CPT

## 2022-11-29 PROCEDURE — XXXXX: CPT | Mod: 1L

## 2022-11-29 PROCEDURE — 93923 UPR/LXTR ART STDY 3+ LVLS: CPT

## 2022-11-29 RX ORDER — METFORMIN HYDROCHLORIDE 500 MG/1
500 TABLET, COATED ORAL
Refills: 0 | Status: DISCONTINUED | COMMUNITY
End: 2022-11-29

## 2023-01-12 NOTE — ASU PREOP CHECKLIST - ANTIBIOTIC
n/a Implemented All Fall with Harm Risk Interventions:  [place] to call system. Call bell, personal items and telephone within reach. Instruct patient to call for assistance. Room bathroom lighting operational. Non-slip footwear when patient is off stretcher. Physically safe environment: no spills, clutter or unnecessary equipment. Stretcher in lowest position, wheels locked, appropriate side rails in place. Provide visual cue, wrist band, yellow gown, etc. Monitor gait and stability. Monitor for mental status changes and reorient to person, place, and time. Review medications for side effects contributing to fall risk. Reinforce activity limits and safety measures with patient and family. Provide visual clues: red socks.

## 2023-06-17 NOTE — DISCHARGE NOTE PROVIDER - CARE PROVIDER_API CALL
Liu Fink)  Vascular Surgery  [address] [facility], [address]  [place] [address]  Phone: [phone]  Fax: [phone]  Follow Up Time: 1 week
Name band;

## 2023-11-01 ENCOUNTER — NON-APPOINTMENT (OUTPATIENT)
Age: 68
End: 2023-11-01

## 2023-11-18 ENCOUNTER — NON-APPOINTMENT (OUTPATIENT)
Age: 68
End: 2023-11-18

## 2024-06-02 ENCOUNTER — INPATIENT (INPATIENT)
Facility: HOSPITAL | Age: 69
LOS: 0 days | Discharge: HOME | DRG: 149 | End: 2024-06-03
Attending: PSYCHIATRY & NEUROLOGY | Admitting: PSYCHIATRY & NEUROLOGY
Payer: COMMERCIAL

## 2024-06-02 VITALS
OXYGEN SATURATION: 98 % | RESPIRATION RATE: 16 BRPM | HEIGHT: 63 IN | TEMPERATURE: 98 F | SYSTOLIC BLOOD PRESSURE: 142 MMHG | HEART RATE: 76 BPM | WEIGHT: 136.91 LBS | DIASTOLIC BLOOD PRESSURE: 91 MMHG

## 2024-06-02 DIAGNOSIS — Z95.1 PRESENCE OF AORTOCORONARY BYPASS GRAFT: Chronic | ICD-10-CM

## 2024-06-02 DIAGNOSIS — Z98.890 OTHER SPECIFIED POSTPROCEDURAL STATES: Chronic | ICD-10-CM

## 2024-06-02 DIAGNOSIS — H81.4 VERTIGO OF CENTRAL ORIGIN: ICD-10-CM

## 2024-06-02 LAB
ALBUMIN SERPL ELPH-MCNC: 4.3 G/DL — SIGNIFICANT CHANGE UP (ref 3.3–5)
ALP SERPL-CCNC: 107 U/L — SIGNIFICANT CHANGE UP (ref 40–120)
ALT FLD-CCNC: 50 U/L — HIGH (ref 10–45)
ANION GAP SERPL CALC-SCNC: 12 MMOL/L — SIGNIFICANT CHANGE UP (ref 5–17)
APPEARANCE UR: CLEAR — SIGNIFICANT CHANGE UP
APTT BLD: 29.5 SEC — SIGNIFICANT CHANGE UP (ref 24.5–35.6)
AST SERPL-CCNC: 48 U/L — HIGH (ref 10–40)
BASOPHILS # BLD AUTO: 0.01 K/UL — SIGNIFICANT CHANGE UP (ref 0–0.2)
BASOPHILS NFR BLD AUTO: 0.2 % — SIGNIFICANT CHANGE UP (ref 0–2)
BILIRUB SERPL-MCNC: 0.8 MG/DL — SIGNIFICANT CHANGE UP (ref 0.2–1.2)
BILIRUB UR-MCNC: NEGATIVE — SIGNIFICANT CHANGE UP
BUN SERPL-MCNC: 21 MG/DL — SIGNIFICANT CHANGE UP (ref 7–23)
CALCIUM SERPL-MCNC: 9.8 MG/DL — SIGNIFICANT CHANGE UP (ref 8.4–10.5)
CHLORIDE SERPL-SCNC: 104 MMOL/L — SIGNIFICANT CHANGE UP (ref 96–108)
CO2 SERPL-SCNC: 22 MMOL/L — SIGNIFICANT CHANGE UP (ref 22–31)
COLOR SPEC: YELLOW — SIGNIFICANT CHANGE UP
CREAT SERPL-MCNC: 1.06 MG/DL — SIGNIFICANT CHANGE UP (ref 0.5–1.3)
DIFF PNL FLD: NEGATIVE — SIGNIFICANT CHANGE UP
EGFR: 75 ML/MIN/1.73M2 — SIGNIFICANT CHANGE UP
EOSINOPHIL # BLD AUTO: 0.12 K/UL — SIGNIFICANT CHANGE UP (ref 0–0.5)
EOSINOPHIL NFR BLD AUTO: 2.9 % — SIGNIFICANT CHANGE UP (ref 0–6)
GLUCOSE BLDC GLUCOMTR-MCNC: 154 MG/DL — HIGH (ref 70–99)
GLUCOSE BLDC GLUCOMTR-MCNC: 200 MG/DL — HIGH (ref 70–99)
GLUCOSE SERPL-MCNC: 187 MG/DL — HIGH (ref 70–99)
GLUCOSE UR QL: NEGATIVE MG/DL — SIGNIFICANT CHANGE UP
HCT VFR BLD CALC: 45.7 % — SIGNIFICANT CHANGE UP (ref 39–50)
HGB BLD-MCNC: 15.1 G/DL — SIGNIFICANT CHANGE UP (ref 13–17)
IMM GRANULOCYTES NFR BLD AUTO: 0.2 % — SIGNIFICANT CHANGE UP (ref 0–0.9)
INR BLD: 1.12 RATIO — SIGNIFICANT CHANGE UP (ref 0.85–1.18)
KETONES UR-MCNC: NEGATIVE MG/DL — SIGNIFICANT CHANGE UP
LEUKOCYTE ESTERASE UR-ACNC: NEGATIVE — SIGNIFICANT CHANGE UP
LYMPHOCYTES # BLD AUTO: 1.38 K/UL — SIGNIFICANT CHANGE UP (ref 1–3.3)
LYMPHOCYTES # BLD AUTO: 33.4 % — SIGNIFICANT CHANGE UP (ref 13–44)
MCHC RBC-ENTMCNC: 27.3 PG — SIGNIFICANT CHANGE UP (ref 27–34)
MCHC RBC-ENTMCNC: 33 GM/DL — SIGNIFICANT CHANGE UP (ref 32–36)
MCV RBC AUTO: 82.5 FL — SIGNIFICANT CHANGE UP (ref 80–100)
MONOCYTES # BLD AUTO: 0.46 K/UL — SIGNIFICANT CHANGE UP (ref 0–0.9)
MONOCYTES NFR BLD AUTO: 11.1 % — SIGNIFICANT CHANGE UP (ref 2–14)
NEUTROPHILS # BLD AUTO: 2.15 K/UL — SIGNIFICANT CHANGE UP (ref 1.8–7.4)
NEUTROPHILS NFR BLD AUTO: 52.2 % — SIGNIFICANT CHANGE UP (ref 43–77)
NITRITE UR-MCNC: NEGATIVE — SIGNIFICANT CHANGE UP
NRBC # BLD: 0 /100 WBCS — SIGNIFICANT CHANGE UP (ref 0–0)
PH UR: 6.5 — SIGNIFICANT CHANGE UP (ref 5–8)
PLATELET # BLD AUTO: 167 K/UL — SIGNIFICANT CHANGE UP (ref 150–400)
POTASSIUM SERPL-MCNC: 4.7 MMOL/L — SIGNIFICANT CHANGE UP (ref 3.5–5.3)
POTASSIUM SERPL-SCNC: 4.7 MMOL/L — SIGNIFICANT CHANGE UP (ref 3.5–5.3)
PROT SERPL-MCNC: 7.9 G/DL — SIGNIFICANT CHANGE UP (ref 6–8.3)
PROT UR-MCNC: NEGATIVE MG/DL — SIGNIFICANT CHANGE UP
PROTHROM AB SERPL-ACNC: 11.7 SEC — SIGNIFICANT CHANGE UP (ref 9.5–13)
RBC # BLD: 5.54 M/UL — SIGNIFICANT CHANGE UP (ref 4.2–5.8)
RBC # FLD: 14.7 % — HIGH (ref 10.3–14.5)
SODIUM SERPL-SCNC: 138 MMOL/L — SIGNIFICANT CHANGE UP (ref 135–145)
SP GR SPEC: >1.03 — HIGH (ref 1–1.03)
TROPONIN T, HIGH SENSITIVITY RESULT: 9 NG/L — SIGNIFICANT CHANGE UP (ref 0–51)
UROBILINOGEN FLD QL: 1 MG/DL — SIGNIFICANT CHANGE UP (ref 0.2–1)
WBC # BLD: 4.13 K/UL — SIGNIFICANT CHANGE UP (ref 3.8–10.5)
WBC # FLD AUTO: 4.13 K/UL — SIGNIFICANT CHANGE UP (ref 3.8–10.5)

## 2024-06-02 PROCEDURE — 0042T: CPT | Mod: MC

## 2024-06-02 PROCEDURE — 70498 CT ANGIOGRAPHY NECK: CPT | Mod: 26,MC

## 2024-06-02 PROCEDURE — 70450 CT HEAD/BRAIN W/O DYE: CPT | Mod: 26,MC,59

## 2024-06-02 PROCEDURE — 70496 CT ANGIOGRAPHY HEAD: CPT | Mod: 26,MC

## 2024-06-02 PROCEDURE — 99291 CRITICAL CARE FIRST HOUR: CPT

## 2024-06-02 PROCEDURE — 70551 MRI BRAIN STEM W/O DYE: CPT | Mod: 26

## 2024-06-02 RX ORDER — DEXTROSE 50 % IN WATER 50 %
12.5 SYRINGE (ML) INTRAVENOUS ONCE
Refills: 0 | Status: DISCONTINUED | OUTPATIENT
Start: 2024-06-02 | End: 2024-06-03

## 2024-06-02 RX ORDER — MECLIZINE HCL 12.5 MG
25 TABLET ORAL ONCE
Refills: 0 | Status: COMPLETED | OUTPATIENT
Start: 2024-06-02 | End: 2024-06-02

## 2024-06-02 RX ORDER — FAMOTIDINE 10 MG/ML
20 INJECTION INTRAVENOUS DAILY
Refills: 0 | Status: DISCONTINUED | OUTPATIENT
Start: 2024-06-02 | End: 2024-06-03

## 2024-06-02 RX ORDER — DEXTROSE 10 % IN WATER 10 %
125 INTRAVENOUS SOLUTION INTRAVENOUS ONCE
Refills: 0 | Status: DISCONTINUED | OUTPATIENT
Start: 2024-06-02 | End: 2024-06-03

## 2024-06-02 RX ORDER — GLUCAGON INJECTION, SOLUTION 0.5 MG/.1ML
1 INJECTION, SOLUTION SUBCUTANEOUS ONCE
Refills: 0 | Status: DISCONTINUED | OUTPATIENT
Start: 2024-06-02 | End: 2024-06-03

## 2024-06-02 RX ORDER — DEXTROSE 50 % IN WATER 50 %
25 SYRINGE (ML) INTRAVENOUS ONCE
Refills: 0 | Status: DISCONTINUED | OUTPATIENT
Start: 2024-06-02 | End: 2024-06-03

## 2024-06-02 RX ORDER — ATORVASTATIN CALCIUM 80 MG/1
80 TABLET, FILM COATED ORAL AT BEDTIME
Refills: 0 | Status: DISCONTINUED | OUTPATIENT
Start: 2024-06-02 | End: 2024-06-03

## 2024-06-02 RX ORDER — INSULIN LISPRO 100/ML
VIAL (ML) SUBCUTANEOUS AT BEDTIME
Refills: 0 | Status: DISCONTINUED | OUTPATIENT
Start: 2024-06-02 | End: 2024-06-03

## 2024-06-02 RX ORDER — DEXTROSE 50 % IN WATER 50 %
15 SYRINGE (ML) INTRAVENOUS ONCE
Refills: 0 | Status: DISCONTINUED | OUTPATIENT
Start: 2024-06-02 | End: 2024-06-03

## 2024-06-02 RX ORDER — METOPROLOL TARTRATE 50 MG
50 TABLET ORAL DAILY
Refills: 0 | Status: DISCONTINUED | OUTPATIENT
Start: 2024-06-02 | End: 2024-06-03

## 2024-06-02 RX ORDER — ASPIRIN/CALCIUM CARB/MAGNESIUM 324 MG
81 TABLET ORAL DAILY
Refills: 0 | Status: DISCONTINUED | OUTPATIENT
Start: 2024-06-02 | End: 2024-06-03

## 2024-06-02 RX ORDER — METFORMIN HYDROCHLORIDE 850 MG/1
1 TABLET ORAL
Qty: 0 | Refills: 0 | DISCHARGE

## 2024-06-02 RX ORDER — SODIUM CHLORIDE 9 MG/ML
1000 INJECTION, SOLUTION INTRAVENOUS
Refills: 0 | Status: DISCONTINUED | OUTPATIENT
Start: 2024-06-02 | End: 2024-06-03

## 2024-06-02 RX ORDER — FAMOTIDINE 10 MG/ML
1 INJECTION INTRAVENOUS
Refills: 0 | DISCHARGE

## 2024-06-02 RX ORDER — ASPIRIN/CALCIUM CARB/MAGNESIUM 324 MG
1 TABLET ORAL
Qty: 0 | Refills: 0 | DISCHARGE

## 2024-06-02 RX ORDER — METFORMIN HYDROCHLORIDE 850 MG/1
500 TABLET ORAL DAILY
Refills: 0 | Status: DISCONTINUED | OUTPATIENT
Start: 2024-06-02 | End: 2024-06-02

## 2024-06-02 RX ORDER — CLOPIDOGREL BISULFATE 75 MG/1
75 TABLET, FILM COATED ORAL DAILY
Refills: 0 | Status: DISCONTINUED | OUTPATIENT
Start: 2024-06-03 | End: 2024-06-03

## 2024-06-02 RX ORDER — ROSUVASTATIN CALCIUM 5 MG/1
1 TABLET ORAL
Refills: 0 | DISCHARGE

## 2024-06-02 RX ORDER — METOCLOPRAMIDE HCL 10 MG
10 TABLET ORAL ONCE
Refills: 0 | Status: COMPLETED | OUTPATIENT
Start: 2024-06-02 | End: 2024-06-02

## 2024-06-02 RX ORDER — CLOPIDOGREL BISULFATE 75 MG/1
300 TABLET, FILM COATED ORAL ONCE
Refills: 0 | Status: COMPLETED | OUTPATIENT
Start: 2024-06-02 | End: 2024-06-02

## 2024-06-02 RX ORDER — INSULIN LISPRO 100/ML
VIAL (ML) SUBCUTANEOUS
Refills: 0 | Status: DISCONTINUED | OUTPATIENT
Start: 2024-06-02 | End: 2024-06-03

## 2024-06-02 RX ORDER — METOPROLOL TARTRATE 50 MG
1 TABLET ORAL
Qty: 0 | Refills: 0 | DISCHARGE

## 2024-06-02 RX ORDER — ATORVASTATIN CALCIUM 80 MG/1
1 TABLET, FILM COATED ORAL
Qty: 0 | Refills: 0 | DISCHARGE

## 2024-06-02 RX ORDER — EZETIMIBE 10 MG/1
1 TABLET ORAL
Refills: 0 | DISCHARGE

## 2024-06-02 RX ORDER — ENOXAPARIN SODIUM 100 MG/ML
40 INJECTION SUBCUTANEOUS EVERY 24 HOURS
Refills: 0 | Status: DISCONTINUED | OUTPATIENT
Start: 2024-06-02 | End: 2024-06-03

## 2024-06-02 RX ADMIN — Medication 25 MILLIGRAM(S): at 12:11

## 2024-06-02 RX ADMIN — Medication 10 MILLIGRAM(S): at 12:11

## 2024-06-02 RX ADMIN — Medication 2: at 18:45

## 2024-06-02 RX ADMIN — ATORVASTATIN CALCIUM 80 MILLIGRAM(S): 80 TABLET, FILM COATED ORAL at 21:23

## 2024-06-02 RX ADMIN — CLOPIDOGREL BISULFATE 300 MILLIGRAM(S): 75 TABLET, FILM COATED ORAL at 12:51

## 2024-06-02 NOTE — H&P ADULT - HISTORY OF PRESENT ILLNESS
Neurology - Consult Note    Spectra: [phone] ([facility]), [phone] ([facility])    HPI: 70 y/o male with a PMH of HTN, DM, HLD and R ICA stenosis s/p stenting (2020) presents with acute onset dizziness and gait difficulties. Patient was at his baseline after he returned from a concert with his daughter for an early fathers day celebration He woke up at 0630 am and found himself to be dizzy. he describes it as room spinning dizziness. He says it does have some positional component and worsens when he looks down with his head. (upon neck flexion). He states the dizziness persisted all morning and there was no factors that improved his symptoms. he denies a previous history of dizziness/vertigo. Additionally the patient reports significant difficulty and unsteadiness while walking. He states he is swaying unclear to which side. He also endorses a bifrontal headache which is mild. Denies photophobia or phonophobia. He states he does not have a history of migraines.  Denies nausea, vomiting, ear pain or discharge. Denies diplopia, dysarthria or dysphagia.     He denies a prior history of stroke. He lives with his family and is fully functional and independent at baseline. He ambulates by himself and still drives. Denies smoking. alcohol or drug use. He is compliant with his medications.       Review of Systems:     CONSTITUTIONAL: No fevers or chills  EYES AND ENT: No visual changes or no throat pain   NECK: No pain or stiffness  RESPIRATORY: No shortness of breath  CARDIOVASCULAR: No chest pain or palpitations  GASTROINTESTINAL: No nausea or vomiting   GENITOURINARY: No dysuria  NEUROLOGICAL: +As stated in HPI above  SKIN: No itching, burning, rashes, or lesions   All other review of systems is negative unless indicated above.    Allergies:  penicillin (Hives; Urticaria; Rash)      PMHx/PSHx/Family Hx: As above, otherwise see below   Hepatitis C infection    Psoriasis    CAD (coronary artery disease)    HTN (hypertension)    Hyperlipidemia    Hyperopia of both eyes with astigmatism and presbyopia    Occlusion of carotid artery    Diabetes mellitus, type 2    Hearing loss    History of alcohol abuse    History of drug abuse        Social Hx:  Never smoker; no current use of tobacco, alcohol, or illicit drugs  Lives with family; baseline functional status is fully independent with ADLs    Medications:  MEDICATIONS  (STANDING):    MEDICATIONS  (PRN):      Vitals:  T(C): 36.5 (06-02-24 @ 11:55), Max: 36.5 (06-02-24 @ 11:34)  HR: 56 (06-02-24 @ 11:55) (56 - 76)  BP: 120/62 (06-02-24 @ 11:55) (120/62 - 142/91)  RR: 18 (06-02-24 @ 11:55) (16 - 18)  SpO2: 98% (06-02-24 @ 11:55) (98% - 98%)      Labs:                        15.1   4.13  )-----------( 167      ( 02 Jun 2024 11:42 )             45.7     06-02    138  |  104  |  21  ----------------------------<  187<H>  4.7   |  22  |  1.06    Ca    9.8      02 Jun 2024 11:42    TPro  7.9  /  Alb  4.3  /  TBili  0.8  /  DBili  x   /  AST  48<H>  /  ALT  50<H>  /  AlkPhos  107  06-02    CAPILLARY BLOOD GLUCOSE      POCT Blood Glucose.: 161 mg/dL (02 Jun 2024 11:36)    LIVER FUNCTIONS - ( 02 Jun 2024 11:42 )  Alb: 4.3 g/dL / Pro: 7.9 g/dL / ALK PHOS: 107 U/L / ALT: 50 U/L / AST: 48 U/L / GGT: x             PT/INR - ( 02 Jun 2024 11:42 )   PT: 11.7 sec;   INR: 1.12 ratio         PTT - ( 02 Jun 2024 11:42 )  PTT:29.5 sec

## 2024-06-02 NOTE — ED PROVIDER NOTE - PHYSICAL EXAMINATION
GENERAL: well appearing in no acute distress, non-toxic appearing  HEAD: normocephalic, atraumatic  HEENT: oral mucosa moist  CARDIAC: regular rate and rhythm, normal S1S2  PULM: normal breath sounds, clear to ascultation bilaterally, no rales, rhonchi, wheezing  GI: Abd soft, nondistended, nontender, no rebound tenderness, no guarding, no rigidity  NEURO: no focal motor or sensory deficits, CN2-12 intact, normal speech, PERRLA, EOMI, +unsteady gait, AAOx3  MSK: no peripheral edema, no calf tenderness b/l

## 2024-06-02 NOTE — H&P ADULT - ASSESSMENT
HPI: 68 y/o male with a PMH of HTN, DM, HLD and R ICA stenosis s/p stenting (2020) presents with acute onset dizziness and gait difficulties. On exam patient is without focal neurologic deficits but is experiencing significant difficulty walking and is seen to drift to the L side. CT head without acute infarct/ hemorrhage although there is a questionable hypodensity in the L rafaela. CTA head without critical stenosis or large vessel occlusion.        LKN: 6/2 at 12 AM  NIHSS: 0  Pre MRS-0    No thrombectomy as no LVO on imaging  No tenecteplase as patient presents outside the window    Impression: Acute onset vertigo, gait difficulties likely secondary to a neurovascular event, low suspicion for peripheral vertigo but possible. Further imaging is warranted to decipher exact  and etiology.     Recommendations:  [] Admit to stroke floor  [] Load with plavix 300 mg now, continue ASA 81 mg for 21 days followed by aspirin alone indefinitely  [] MR brain without contrast  [] TTE without bubble study  [] Check HbA1C and lipid panel  [] Continue with atorvastatin 40 mg at bedtime  [] PT/OT as able  [] Vestibular therapy  [] Telemonitoring; Neurochecks and vital signs Q4H  [] Permissive HTN up to 220/120 mmHg for first 24 hours after the symptom onset followed by gradual normotension.   [] BGM goals 140-180  [] DVT prophylaxis  [] Stroke education provided        Case discussed with Stroke Fellow Dr Lowe under the supervision of neurology attending Dr Anderson     HPI: 68 y/o male with a PMH of HTN, DM, HLD and R ICA stenosis s/p stenting (2020) presents with acute onset dizziness and gait difficulties. On exam patient is without focal neurologic deficits but is experiencing significant difficulty walking and is seen to drift to the L side. CT head without acute infarct/ hemorrhage although there is a questionable hypodensity in the L rafaela. CTA head without critical stenosis or large vessel occlusion.        LKN: 6/2 at 12 AM  NIHSS: 0  Pre MRS-0    No thrombectomy as no LVO on imaging  No tenecteplase as patient presents outside the window    Impression: Acute onset vertigo, gait difficulties likely secondary to a neurovascular event, low suspicion for peripheral vertigo but possible. Further imaging is warranted to decipher exact  and etiology.     Recommendations:  [] Admit to stroke floor  [] Load with plavix 300 mg now, continue ASA 81 mg for 21 days followed by aspirin alone indefinitely  [] MR brain without contrast  [] TTE without bubble study  [] Orthostatic vitals  [] Check HbA1C and lipid panel  [] Continue with atorvastatin 80 mg at bedtime and ezetimibe 10 mg daily  [] Continue metformin 500 mg daily   [] PT/OT as able  [] Vestibular therapy  [] Telemonitoring; Neurochecks and vital signs Q4H  [] Permissive HTN up to 220/120 mmHg for first 24 hours after the symptom onset followed by gradual normotension.   [] BGM goals 140-180  [] DVT prophylaxis  [] Stroke education provided        Case discussed with Stroke Fellow Dr Lowe under the supervision of neurology attending Dr Anderson

## 2024-06-02 NOTE — ED PROVIDER NOTE - CLINICAL SUMMARY MEDICAL DECISION MAKING FREE TEXT BOX
69-year-old male with a history of CAD status post CABG x 2 (2015), HTN, HLD, DM 2, carotid stenosis s/p right CEA, hep C, previous drug and EtOH abuse (none post 2008), presents with dizziness and unstable gait.  Patient last known normal midnight when he went to bed, woke up at approximately 6 AM with dizziness, feeling of numbness across his forehead, and unsteady gait feeling like he was falling to one direction, no fall or trauma, no AC use. dizziness feels like he is spinning, constant, no change with position or movement. AVSS, A&Ox3, CN2-12 intact, PERRLA, EOMI no nystagmus, no drift, no focal motor/sensory deficits, +unsteady gait. C/f central etiology of dizziness, r/o CVA, mass, ich, less likely peripheral etiology. Will get labs, EKG, CT, Neuro consult, and reassess after pharmacological intervention. 69-year-old male with a history of CAD status post CABG x 2 (2015), HTN, HLD, DM 2, carotid stenosis s/p right CEA, hep C, previous drug and EtOH abuse (none post 2008), presents with dizziness and unstable gait.  Patient last known normal midnight when he went to bed, woke up at approximately 6 AM with dizziness, feeling of numbness across his forehead, and unsteady gait feeling like he was falling to one direction, no fall or trauma, no AC use. dizziness feels like he is spinning, constant, no change with position or movement. AVSS, A&Ox3, CN2-12 intact, PERRLA, EOMI no nystagmus, no drift, no focal motor/sensory deficits, +unsteady gait. C/f central etiology of dizziness, r/o CVA, mass, ich, less likely peripheral etiology. Will get labs, EKG, CT, Neuro consult, and reassess after pharmacological intervention.    Dr. Ewing (Attending Physician)

## 2024-06-02 NOTE — ED ADULT NURSE NOTE - OBJECTIVE STATEMENT
Pt is a 68 y/o M with PMH of Drug/Alcohol abuse, non-insulin dependent DM 2, HLD, Hyperopia of both eyes, HTN, CAD, and Hepatitis C presenting with dizziness, unsteady gait, frontal headache, and dizziness starting at 0600 today. LKW 2400 last night. Code stroke called at 1136. Upon assessment pt is a/o x 3 speaking coherently in full sentences with no facial droop/unilateral weakness noted. Gross motor strength and sensation intact across 4 extremities. No limb drift, visual deficits/dysmetria noted on exam with PERRL. No limb Pt is breathing unlabored and equal BL in no apparent distress, radial pulses are 2+ BL, abdomen is soft, nontender, and nondistended, skin is warm and dry. Unsteady gait noted. Pt denies fevers/chills, vision changes, chest pain/SOB, n/v/d, or changes in urinary/defecation habits. Pt is in stretcher in lowest position with side rails up. Pt is a 68 y/o M with PMH of Drug/Alcohol abuse, non-insulin dependent DM 2, HLD, Hyperopia of both eyes, HTN, CAD, and Hepatitis C presenting with dizziness, unsteady gait, frontal headache, and dizziness starting at 0600 today. LKW 2400 last night. Code stroke called at 1136. Upon assessment pt is a/o x 3 speaking coherently in full sentences with no facial droop/unilateral weakness noted. Gross motor strength and sensation intact across 4 extremities. No limb drift, visual deficits/dysmetria/ataxia noted on exam with PERRL. Pt is breathing unlabored and equal BL in no apparent distress, radial pulses are 2+ BL, abdomen is soft, nontender, and nondistended, skin is warm and dry. Unsteady gait noted. Pt denies fevers/chills, vision changes, chest pain/SOB, n/v/d, or changes in urinary/defecation habits. Pt is in stretcher in lowest position with side rails up.

## 2024-06-02 NOTE — H&P ADULT - NSHPLABSRESULTS_GEN_ALL_CORE
CT head: 6/2    IMPRESSION:    No CT evidence of acute intracranial hemorrhage, mass effect, or midline shift. Age-related involutional and microvascular changes.      CT PERFUSION:  Technical limitations: None.    Core infarction: 0 ml  Penumbra / tissue at risk for active ischemia: 0 ml    CTA NECK:  Multifocal calcified plaque including at the LEFT carotid bifurcation resulting in a moderate (50%) stenosis. High-grade stenosis at the origin of the LEFT external carotid artery. Multifocal calcified plaque resulting in mild less than 50% stenosis of the LEFT internal carotid artery. Moderate to high grade stenosis at the origin of the right subclavian artery.      CTA HEAD:  Patent intracranial circulation without flow limiting stenosis.  No evidence of aneurysm. Tiny aneurysms can be beyond the resolution of CTA technique.

## 2024-06-02 NOTE — CONSULT NOTE ADULT - SUBJECTIVE AND OBJECTIVE BOX
Neurology - Consult Note    Spectra: [phone] ([facility]), [phone] ([facility])    HPI:       Review of Systems:  INCOMPLETE   CONSTITUTIONAL: No fevers or chills  EYES AND ENT: No visual changes or no throat pain   NECK: No pain or stiffness  RESPIRATORY: No shortness of breath  CARDIOVASCULAR: No chest pain or palpitations  GASTROINTESTINAL: No nausea or vomiting   GENITOURINARY: No dysuria  NEUROLOGICAL: +As stated in HPI above  SKIN: No itching, burning, rashes, or lesions   All other review of systems is negative unless indicated above.    Allergies:  penicillin (Hives; Urticaria; Rash)      PMHx/PSHx/Family Hx: As above, otherwise see below   Hepatitis C infection    Psoriasis    CAD (coronary artery disease)    HTN (hypertension)    Hyperlipidemia    Hyperopia of both eyes with astigmatism and presbyopia    Occlusion of carotid artery    Diabetes mellitus, type 2    Hearing loss    History of alcohol abuse    History of drug abuse        Social Hx:  Never smoker; no current use of tobacco, alcohol, or illicit drugs  Lives with ***; occupation ***, baseline functional status is ***    Medications:  MEDICATIONS  (STANDING):    MEDICATIONS  (PRN):      Vitals:  T(C): 36.5 (06-02-24 @ 11:34), Max: 36.5 (06-02-24 @ 11:34)  HR: 76 (06-02-24 @ 11:34) (76 - 76)  BP: 142/91 (06-02-24 @ 11:34) (142/91 - 142/91)  RR: 16 (06-02-24 @ 11:34) (16 - 16)  SpO2: 98% (06-02-24 @ 11:34) (98% - 98%)    Physical Examination: INCOMPLETE  General - non-toxic appearing male/female in no acute distress  Cardiovascular - peripheral pulses palpable, no edema  Respiratory - breathing comfortably with no increased work of breathing    Neurologic Exam:  Mental status - Awake, Alert, Oriented to person, place, and time. Speech fluent, repetition and naming intact. Follows simple and complex commands. Attention/concentration, recent and remote memory (including registration 3/3 and recall 3/3), and fund of knowledge intact    Cranial nerves - PERRLA (4mm -> 3mm b/l), VFF, EOMI, face sensation (V1-V3) intact b/l, facial strength intact without asymmetry b/l, hearing intact b/l, palate with symmetric elevation, trapezius OR sternocleidomastiod 5/5 strength b/l, tongue midline on protrusion with full lateral movement    Motor - Normal bulk and tone throughout. No pronator drift.    Strength testing            Deltoid      Biceps      Triceps     Wrist Extension    Wrist Flexion     Interossei         R            5                 5               5                     5                              5                        5                 5  L             5                 5               5                     5                              5                        5                 5              Hip Flexion    Hip Extension    Knee Flexion    Knee Extension    Dorsiflexion    Plantar Flexion  R              5                         5                       5                           5                            5                          5  L              5                         5                        5                           5                            5                          5    Sensation - Light touch/temperature OR pain/vibration intact throughout    DTR's -             Biceps      Triceps     Brachioradialis      Patellar    Ankle    Toes/plantar response  R             2+             2+                  2+                       2+            2+                 Down  L              2+             2+                 2+                        2+           2+                 Down    Coordination - Finger to Nose intact b/l. No tremors appreciated    Gait and station - Normal casual gait. Romberg (-)    Labs:                        15.1   4.13  )-----------( 167      ( 02 Jun 2024 11:42 )             45.7           CAPILLARY BLOOD GLUCOSE      POCT Blood Glucose.: 161 mg/dL (02 Jun 2024 11:36)          CSF:                  Radiology:

## 2024-06-02 NOTE — ED PROVIDER NOTE - NSICDXPASTSURGICALHX_GEN_ALL_CORE_FT
PAST SURGICAL HISTORY:  H/O carotid endarterectomy R ICA    H/O colonoscopy 2019    H/O coronary artery bypass surgery CABG x 2 -10/15/2015    History of umbilical hernia repair 2017

## 2024-06-02 NOTE — H&P ADULT - TIME BILLING
The patient experienced an episode of dizziness and gait instability. An MRI of the brain was completed to exclude ischemic stroke as the etiology of symptoms, showing no evidence of acute ischemic infarcts. However, the MRI did reveal some cortical hyperintensities likely attributable to small vessel disease in the context of the patient's hypertension and diabetes. The CT angiogram of the head and neck indicated some intracranial and extracranial atherosclerosis, none of which are flow-limiting.    The patient's LDL is 62, which is on target, but the A1C is 8.1, indicating suboptimal diabetes control. Given the patient's symptomatology and imaging results, the likely etiology is not vascular.    Recommendations:  - Continue primary stroke prevention measures, maintaining LDL below 70.  - Follow up with the primary care provider (PCP) for better diabetes management and to discuss dietary modifications.  - Incorporate an exercise routine to improve overall health and diabetes control.  - Follow up with cardiology to review recent echocardiogram results.

## 2024-06-02 NOTE — PATIENT PROFILE ADULT - FALL HARM RISK - HARM RISK INTERVENTIONS

## 2024-06-02 NOTE — ED PROVIDER NOTE - NSICDXPASTMEDICALHX_GEN_ALL_CORE_FT
PAST MEDICAL HISTORY:  CAD (coronary artery disease)     Diabetes mellitus, type 2     Hearing loss right ear    Hepatitis C infection treated with Harvoni 2015    History of alcohol abuse none since 2008    History of drug abuse none since 2008    HTN (hypertension)     Hyperlipidemia     Hyperopia of both eyes with astigmatism and presbyopia right eye    Occlusion of carotid artery right ICA 60-79%    Psoriasis

## 2024-06-02 NOTE — ED ADULT NURSE NOTE - NSFALLRISKINTERV_ED_ALL_ED

## 2024-06-02 NOTE — ED PROVIDER NOTE - NSICDXFAMILYHX_GEN_ALL_CORE_FT
FAMILY HISTORY:  Mother  Still living? Yes, Estimated age: Age Unknown  Family history of alcoholism, Age at diagnosis: Age Unknown  Family history of cirrhosis of liver, Age at diagnosis: Age Unknown    Grandparent  Still living? Unknown  Family history of stroke, Age at diagnosis: Age Unknown

## 2024-06-03 ENCOUNTER — TRANSCRIPTION ENCOUNTER (OUTPATIENT)
Age: 69
End: 2024-06-03

## 2024-06-03 ENCOUNTER — RESULT REVIEW (OUTPATIENT)
Age: 69
End: 2024-06-03

## 2024-06-03 VITALS
DIASTOLIC BLOOD PRESSURE: 69 MMHG | HEART RATE: 50 BPM | RESPIRATION RATE: 18 BRPM | SYSTOLIC BLOOD PRESSURE: 143 MMHG | OXYGEN SATURATION: 100 % | TEMPERATURE: 98 F

## 2024-06-03 LAB
A1C WITH ESTIMATED AVERAGE GLUCOSE RESULT: 8.1 % — HIGH (ref 4–5.6)
ANION GAP SERPL CALC-SCNC: 12 MMOL/L — SIGNIFICANT CHANGE UP (ref 5–17)
BUN SERPL-MCNC: 18 MG/DL — SIGNIFICANT CHANGE UP (ref 7–23)
CALCIUM SERPL-MCNC: 9.5 MG/DL — SIGNIFICANT CHANGE UP (ref 8.4–10.5)
CHLORIDE SERPL-SCNC: 105 MMOL/L — SIGNIFICANT CHANGE UP (ref 96–108)
CHOLEST SERPL-MCNC: 99 MG/DL — SIGNIFICANT CHANGE UP
CO2 SERPL-SCNC: 21 MMOL/L — LOW (ref 22–31)
CREAT SERPL-MCNC: 1.02 MG/DL — SIGNIFICANT CHANGE UP (ref 0.5–1.3)
EGFR: 79 ML/MIN/1.73M2 — SIGNIFICANT CHANGE UP
ESTIMATED AVERAGE GLUCOSE: 186 MG/DL — HIGH (ref 68–114)
GLUCOSE BLDC GLUCOMTR-MCNC: 109 MG/DL — HIGH (ref 70–99)
GLUCOSE BLDC GLUCOMTR-MCNC: 91 MG/DL — SIGNIFICANT CHANGE UP (ref 70–99)
GLUCOSE SERPL-MCNC: 152 MG/DL — HIGH (ref 70–99)
HCT VFR BLD CALC: 44.2 % — SIGNIFICANT CHANGE UP (ref 39–50)
HDLC SERPL-MCNC: 37 MG/DL — LOW
HGB BLD-MCNC: 14.6 G/DL — SIGNIFICANT CHANGE UP (ref 13–17)
LIPID PNL WITH DIRECT LDL SERPL: 50 MG/DL — SIGNIFICANT CHANGE UP
MCHC RBC-ENTMCNC: 27 PG — SIGNIFICANT CHANGE UP (ref 27–34)
MCHC RBC-ENTMCNC: 33 GM/DL — SIGNIFICANT CHANGE UP (ref 32–36)
MCV RBC AUTO: 81.9 FL — SIGNIFICANT CHANGE UP (ref 80–100)
NON HDL CHOLESTEROL: 62 MG/DL — SIGNIFICANT CHANGE UP
NRBC # BLD: 0 /100 WBCS — SIGNIFICANT CHANGE UP (ref 0–0)
PLATELET # BLD AUTO: 161 K/UL — SIGNIFICANT CHANGE UP (ref 150–400)
POTASSIUM SERPL-MCNC: 4.7 MMOL/L — SIGNIFICANT CHANGE UP (ref 3.5–5.3)
POTASSIUM SERPL-SCNC: 4.7 MMOL/L — SIGNIFICANT CHANGE UP (ref 3.5–5.3)
RBC # BLD: 5.4 M/UL — SIGNIFICANT CHANGE UP (ref 4.2–5.8)
RBC # FLD: 14.6 % — HIGH (ref 10.3–14.5)
SODIUM SERPL-SCNC: 138 MMOL/L — SIGNIFICANT CHANGE UP (ref 135–145)
TRIGL SERPL-MCNC: 48 MG/DL — SIGNIFICANT CHANGE UP
WBC # BLD: 3.37 K/UL — LOW (ref 3.8–10.5)
WBC # FLD AUTO: 3.37 K/UL — LOW (ref 3.8–10.5)

## 2024-06-03 PROCEDURE — 93356 MYOCRD STRAIN IMG SPCKL TRCK: CPT

## 2024-06-03 PROCEDURE — 93306 TTE W/DOPPLER COMPLETE: CPT | Mod: 26

## 2024-06-03 PROCEDURE — 99236 HOSP IP/OBS SAME DATE HI 85: CPT

## 2024-06-03 RX ORDER — CALCIUM CARBONATE 500(1250)
2 TABLET ORAL EVERY 6 HOURS
Refills: 0 | Status: DISCONTINUED | OUTPATIENT
Start: 2024-06-03 | End: 2024-06-03

## 2024-06-03 RX ADMIN — Medication 50 MILLIGRAM(S): at 05:03

## 2024-06-03 RX ADMIN — FAMOTIDINE 20 MILLIGRAM(S): 10 INJECTION INTRAVENOUS at 12:59

## 2024-06-03 RX ADMIN — Medication 81 MILLIGRAM(S): at 12:59

## 2024-06-03 NOTE — SPEECH LANGUAGE PATHOLOGY EVALUATION - SLP PERTINENT HISTORY OF CURRENT PROBLEM
He denies a prior history of stroke. He lives with his family and is fully functional and independent at baseline. He ambulates by himself and still drives. Denies smoking. alcohol or drug use. He is compliant with his medications. On admit pt Awake, Alert, Oriented to person, place, and time. Speech fluent, repetition and naming intact. Follows simple and complex commands. Attention/concentration, recent and remote memory (including registration 3/3 and recall 3/3), and fund of knowledge intact

## 2024-06-03 NOTE — DISCHARGE NOTE PROVIDER - HOSPITAL COURSE
68 y/o male with a PMH of HTN, DM, HLD and R ICA stenosis s/p stenting () presents with acute onset dizziness and gait difficulties. Patient was at his baseline after he returned from a concert with his daughter for an early  day celebration He woke up at 0630 am and found himself to be dizzy. he describes it as room spinning dizziness. He says it does have some positional component and worsens when he looks down with his head. (upon neck flexion). He states the dizziness persisted all morning and there was no factors that improved his symptoms. he denies a previous history of dizziness/vertigo. Additionally the patient reports significant difficulty and unsteadiness while walking. He states he is swaying unclear to which side. He also endorses a bifrontal headache which is mild.    Imagin24  MRI HEAD: No acute infarct.    Ct HEAD: No CT evidence of acute intracranial hemorrhage, mass effect, or midline   shift. Age-related involutional and microvascular changes.      CT PERFUSION:  Technical limitations: None.    Core infarction: 0 ml  Penumbra / tissue at risk for active ischemia: 0 ml    CTA NECK:  Multifocal calcified plaque including at the LEFT carotid bifurcation   resulting in a moderate (50%) stenosis. High-grade stenosis at the origin   of the LEFT external carotid artery. Multifocal calcified plaque   resulting in mild less than 50% stenosis of the LEFT internal carotid   artery. Moderate to high grade stenosis at the origin of the right   subclavian artery.      CTA HEAD:  Patent intracranial circulation without flow limiting stenosis.  No evidence of aneurysm    Impression: Peripheral vertigo, now resolved, MRI negative for acute infarct.  Will continue home medication regimen.  Evaluated by PT/OT and was recommended home with no needs. Patient stable for discharge.

## 2024-06-03 NOTE — DISCHARGE NOTE PROVIDER - CARE PROVIDER_API CALL
Randal Fragoso.  Neurology  [address], [address]  [place] [address]  Phone: [phone]  Fax: [phone]  Follow Up Time: 2 weeks

## 2024-06-03 NOTE — PHYSICAL THERAPY INITIAL EVALUATION ADULT - PERTINENT HX OF CURRENT PROBLEM, REHAB EVAL
PMHx: HTN, DM, HLD and R ICA stenosis s/p stenting (2020). presents with acute onset dizziness and gait difficulties. LKN: 6/2 at 12 AM. Pt woke up at 0630 am and found himself to be dizzy, describes as room spinning dizziness. with some positional component and worsens when he looks down with his head (upon neck flexion). He states the dizziness persisted all morning and there was no factors that improved his symptoms. Additionally the patient reports significant difficulty and unsteadiness while walking. He states he is swaying unclear to which side. He also endorses a bifrontal headache which is mild. Denies photophobia or phonophobia. CT head without acute infarct/ hemorrhage although there is a questionable hypodensity in the L rafaela. CTA head without critical stenosis or large vessel occlusion. NIHSS 0. No thrombectomy as no LVO on imaging. No tenecteplase as patient presents outside the window    CTH: No CT evidence of acute intracranial hemorrhage, mass effect, or midline shift. Age-related involutional and microvascular changes.  CTP: Core infarction: 0 ml. Penumbra / tissue at risk for active ischemia: 0 ml  CTA NECK: Multifocal calcified plaque including at the L carotid bifurcation resulting in a moderate (50%) stenosis. High-grade stenosis at the origin of the L external carotid artery. Multifocal calcified plaque resulting in mild less than 50% stenosis of the L ICA. Moderate to high grade stenosis at the origin of the R subclavian artery.  CTA HEAD: Patent intracranial circulation without flow limiting stenosis. No evidence of aneurysm.

## 2024-06-03 NOTE — OCCUPATIONAL THERAPY INITIAL EVALUATION ADULT - PERTINENT HX OF CURRENT PROBLEM, REHAB EVAL
Pt is a 70 y/o male with a PMH of HTN, DM, HLD and R ICA stenosis s/p stenting (2020) presents with acute onset dizziness and gait difficulties. Patient was at his baseline, woke up at 0630 am and found himself to be dizzy. he describes it as room spinning dizziness. He says it does have some positional component and worsens when he looks down with his head. (upon neck flexion). He states the dizziness persisted all morning and there was no factors that improved his symptoms. he denies a previous history of dizziness/vertigo. Additionally the patient reports significant difficulty and unsteadiness while walking. He states he is swaying unclear to which side. He also endorses a bifrontal headache which is mild. Denies photophobia or phonophobia. He states he does not have a history of migraines.  Denies nausea, vomiting ear pain or discharge. Denies diplopia, dysarthria or dysphagia. CT Brain 5/2:Multifocal calcified plaque including at the LEFT carotid bifurcation resulting in a moderate (50%) stenosis. High-grade stenosis at the origin of the LEFT external carotid artery. Multifocal calcified plaque resulting in mild less than 50% stenosis of the LEFT internal carotid artery. Moderate to high grade stenosis at the origin of the right subclavian artery. CT Brain 5/2: CT PERFUSION:Technical limitations: None. Core infarction: 0 ml  Penumbra / tissue at risk for active ischemia: 0 ml.  CTA NECK: Multifocal calcified plaque including at the LEFT carotid bifurcation   resulting in a moderate (50%) stenosis. High-grade stenosis at the origin   of the LEFT external carotid artery. Multifocal calcified plaque   resulting in mild less than 50% stenosis of the LEFT internal carotid   artery. Moderate to high grade stenosis at the origin of the right   subclavian artery. MRI Head: no acute infarct Pt is a 70 y/o male with a PMH of HTN, DM, HLD and R ICA stenosis s/p stenting (2020) presents with acute onset dizziness and gait difficulties. Patient was at his baseline, woke up at 0630 am and found himself to be dizzy. he describes it as room spinning dizziness. He says it does have some positional component and worsens when he looks down with his head. (upon neck flexion). He states the dizziness persisted all morning and there was no factors that improved his symptoms. he denies a previous history of dizziness/vertigo. Additionally the patient reports significant difficulty and unsteadiness while walking. He states he is swaying unclear to which side. He also endorses a bifrontal headache which is mild. Denies photophobia or phonophobia. He states he does not have a history of migraines.  Denies nausea, vomiting ear pain or discharge. Denies diplopia, dysarthria or dysphagia. CT Brain 5/2:Multifocal calcified plaque including at the LEFT carotid bifurcation resulting in a moderate (50%) stenosis. High-grade stenosis at the origin of the LEFT external carotid artery. Multifocal calcified plaque resulting in mild less than 50% stenosis of the LEFT internal carotid artery. Moderate to high grade stenosis at the origin of the right subclavian artery. CT Brain 5/2: CT PERFUSION:Technical limitations: None. Core infarction: 0 ml  Penumbra / tissue at risk for active ischemia: 0 ml.  CTA NECK: Multifocal calcified plaque including at the LEFT carotid bifurcation   resulting in a moderate (50%) stenosis. High-grade stenosis at the origin   of the LEFT external carotid artery. Multifocal calcified plaque   resulting in mild less than 50% stenosis of the LEFT internal carotid   artery. Moderate to high grade stenosis at the origin of the right   subclavian artery.   CTA HEAD: Patent intracranial circulation without flow limiting stenosis. No evidence of aneurysm.  MRI Head: no acute infarct

## 2024-06-03 NOTE — OCCUPATIONAL THERAPY INITIAL EVALUATION ADULT - GENERAL OBSERVATIONS, REHAB EVAL
Upon entry, patient seated in chair at bedside, patient agreeable to OT eval, cleared for OT evaluation as per CRISTIAN crowe

## 2024-06-03 NOTE — SPEECH LANGUAGE PATHOLOGY EVALUATION - SLP DIAGNOSIS
Pt to be seen for speech language evaluation today. Chart reviewed, attempted to see pt for speech language evaluation however pt actively being discharged. Please f/u outpatient if speech language changes are noted.

## 2024-06-03 NOTE — PHYSICAL THERAPY INITIAL EVALUATION ADULT - ADDITIONAL COMMENTS
lives in apartment with 2-3 steps to enter with 1 rail, then 2 flights inside with bilateral rails, left hand dominant, wears glasses

## 2024-06-03 NOTE — DISCHARGE NOTE PROVIDER - NSDCMRMEDTOKEN_GEN_ALL_CORE_FT
aspirin 81 mg oral tablet: 1 tab(s) orally once a day  ezetimibe 10 mg oral tablet: 1 tab(s) orally once a day  famotidine 20 mg oral tablet: 1 tab(s) orally once a day  glipiZIDE 10 mg oral tablet, extended release: 1 tab(s) orally once a day  metFORMIN 500 mg oral tablet, extended release: 1 tab(s) orally once a day  Metoprolol Tartrate 50 mg oral tablet: 1 tab(s) orally 2 times a day  rosuvastatin 40 mg oral tablet: 1 tab(s) orally once a day

## 2024-06-03 NOTE — DISCHARGE NOTE NURSING/CASE MANAGEMENT/SOCIAL WORK - NSDCPEFALRISK_GEN_ALL_CORE
For information on Fall & Injury Prevention, visit: [email or web address] OR  [email or web address] OR  https://www.cdc.gov/steadi/patient.html

## 2024-06-03 NOTE — PROVIDER CONTACT NOTE (OTHER) - SITUATION
Tele notified for low HR in 40s. Patient asleep. All other vitals stable. HR rises when awake. Asymptomatic

## 2024-06-03 NOTE — DISCHARGE NOTE PROVIDER - NSDCCPCAREPLAN_GEN_ALL_CORE_FT
PRINCIPAL DISCHARGE DIAGNOSIS  Diagnosis: Vertigo  Assessment and Plan of Treatment: Please follow up with neurologist in 1 week. The office will call you to schedule an appointment, if you do not hear from them please call [phone]. Continue taking medications as prescribed. If you were prescribed a statin for your cholesterol please make sure you have your liver enzymes checked with your primary care physician. Monitor your blood pressure. Reduce fat, cholesterol and salt in your diet. Increase intake of fruits and vegetables. Limit alcohol to minimum and do not smoke. You may be at risk for falling, make changes to your home to help you walk easier. Keep up to date on vaccinations.  If you experience any symptoms of facial drooping, slurred speech, arm or leg weakness, severe headache, vision changes or any worsening symptoms, notify provider immediately and return to ER.

## 2024-06-03 NOTE — OCCUPATIONAL THERAPY INITIAL EVALUATION ADULT - LIVES WITH, PROFILE
Lives with wife and son in apartment. ~4 steps to enter building and 2 flights around ~13 steps to 3rd floor apartment. Pt independent with ADL/IADL/functonal mobility, worked and would drive, denies falls or owning/using any DME/children/spouse

## 2024-06-03 NOTE — SPEECH LANGUAGE PATHOLOGY EVALUATION - H & P REVIEW
68 y/o male with a PMH of HTN, DM, HLD and R ICA stenosis s/p stenting (2020) presents with acute onset dizziness and gait difficulties. Patient was at his baseline after he returned from a concert with his daughter for an early fathers day celebration He woke up at 0630 am and found himself to be dizzy. he describes it as room spinning dizziness. He says it does have some positional component and worsens when he looks down with his head. (upon neck flexion). He states the dizziness persisted all morning and there was no factors that improved his symptoms. he denies a previous history of dizziness/vertigo. Additionally the patient reports significant difficulty and unsteadiness while walking. He states he is swaying unclear to which side. He also endorses a bifrontal headache which is mild. Denies photophobia or phonophobia. He states he does not have a history of migraines.  Denies nausea, vomiting, ear pain or discharge. Denies diplopia, dysarthria or dysphagia./yes

## 2024-06-03 NOTE — SPEECH LANGUAGE PATHOLOGY EVALUATION - COMMENTS
Pt admitted for acute onset vertigo. Impression: Acute onset vertigo, gait difficulties likely secondary to a neurovascular event, low suspicion for peripheral vertigo but possible. Further imaging is warranted to decipher exact  and etiology.    6/2: CT PERFUSION: Technical limitations: None. Core infarction: 0 ml Penumbra / tissue at risk for active ischemia: 0 ml    CTA NECK: Multifocal calcified plaque including at the LEFT carotid bifurcation resulting in a moderate (50%) stenosis. High-grade stenosis at the origin of the LEFT external carotid artery. Multifocal calcified plaque resulting in mild less than 50% stenosis of the LEFT internal carotid artery. Moderate to high grade stenosis at the origin of the right subclavian artery.  CTA HEAD: Patent intracranial circulation without flow limiting stenosis. No evidence of aneurysm. Tiny aneurysms can be beyond the resolution of CTA technique.    MRI->IMPRESSION: No acute infarct.    Pt unknown to this service

## 2024-06-03 NOTE — DISCHARGE NOTE NURSING/CASE MANAGEMENT/SOCIAL WORK - PATIENT PORTAL LINK FT
You can access the FollowMyHealth Patient Portal offered by [facility] by registering at the following website: [email or web address]. By joining [facility]’s FollowMyHealth portal, you will also be able to view your health information using other applications (apps) compatible with our system.

## 2024-06-11 ENCOUNTER — APPOINTMENT (OUTPATIENT)
Dept: VASCULAR SURGERY | Facility: CLINIC | Age: 69
End: 2024-06-11
Payer: COMMERCIAL

## 2024-06-11 PROCEDURE — 99204 OFFICE O/P NEW MOD 45 MIN: CPT

## 2024-06-11 PROCEDURE — 93880 EXTRACRANIAL BILAT STUDY: CPT

## 2024-06-11 NOTE — PHYSICAL EXAM
[Normal Breath Sounds] : Normal breath sounds [Normal Rate and Rhythm] : normal rate and rhythm [2+] : left 2+ [No Rash or Lesion] : No rash or lesion [Alert] : alert [Calm] : calm [JVD] : no jugular venous distention  [Ankle Swelling (On Exam)] : not present [Skin Ulcer] : no ulcer [de-identified] : appears well, no acute distress noted [de-identified] : No facial asymmetry, tongue is midline [de-identified] : Intact

## 2024-06-11 NOTE — ASSESSMENT
[FreeTextEntry1] : 69-year-old male with history of carotid stenosis status post right carotid endarterectomy.  In the office today, patient underwent duplex which demonstrates a widely patent right carotid endarterectomy.  Left ICA with moderate stenosis unchanged from previous study.  Patient is currently asymptomatic.  Patient to continue conservative management with aspirin and rosuvastatin.  Follow-up in 6 months with carotid duplex.

## 2024-06-11 NOTE — HISTORY OF PRESENT ILLNESS
[FreeTextEntry1] : Patient is a 69-year-old male with history significant for coronary artery disease s/p CABG and carotid stenosis s/p right carotid endarterectomy who presents for follow up.  Patient reports recent syncopal episode.  No history of TIA or CVA.

## 2024-09-29 PROCEDURE — 96374 THER/PROPH/DIAG INJ IV PUSH: CPT

## 2024-09-29 PROCEDURE — 85014 HEMATOCRIT: CPT

## 2024-09-29 PROCEDURE — 85018 HEMOGLOBIN: CPT

## 2024-09-29 PROCEDURE — 85027 COMPLETE CBC AUTOMATED: CPT

## 2024-09-29 PROCEDURE — 82962 GLUCOSE BLOOD TEST: CPT

## 2024-09-29 PROCEDURE — 82947 ASSAY GLUCOSE BLOOD QUANT: CPT

## 2024-09-29 PROCEDURE — 83036 HEMOGLOBIN GLYCOSYLATED A1C: CPT

## 2024-09-29 PROCEDURE — 70551 MRI BRAIN STEM W/O DYE: CPT | Mod: MC

## 2024-09-29 PROCEDURE — 70498 CT ANGIOGRAPHY NECK: CPT | Mod: MC

## 2024-09-29 PROCEDURE — 84132 ASSAY OF SERUM POTASSIUM: CPT

## 2024-09-29 PROCEDURE — 70496 CT ANGIOGRAPHY HEAD: CPT | Mod: MC

## 2024-09-29 PROCEDURE — 80061 LIPID PANEL: CPT

## 2024-09-29 PROCEDURE — 82330 ASSAY OF CALCIUM: CPT

## 2024-09-29 PROCEDURE — 36415 COLL VENOUS BLD VENIPUNCTURE: CPT

## 2024-09-29 PROCEDURE — 99285 EMERGENCY DEPT VISIT HI MDM: CPT

## 2024-09-29 PROCEDURE — 85610 PROTHROMBIN TIME: CPT

## 2024-09-29 PROCEDURE — 80053 COMPREHEN METABOLIC PANEL: CPT

## 2024-09-29 PROCEDURE — 81003 URINALYSIS AUTO W/O SCOPE: CPT

## 2024-09-29 PROCEDURE — 84484 ASSAY OF TROPONIN QUANT: CPT

## 2024-09-29 PROCEDURE — 80048 BASIC METABOLIC PNL TOTAL CA: CPT

## 2024-09-29 PROCEDURE — 93306 TTE W/DOPPLER COMPLETE: CPT

## 2024-09-29 PROCEDURE — 85730 THROMBOPLASTIN TIME PARTIAL: CPT

## 2024-09-29 PROCEDURE — 93356 MYOCRD STRAIN IMG SPCKL TRCK: CPT

## 2024-09-29 PROCEDURE — 85025 COMPLETE CBC W/AUTO DIFF WBC: CPT

## 2024-09-29 PROCEDURE — 97161 PT EVAL LOW COMPLEX 20 MIN: CPT

## 2024-09-29 PROCEDURE — 84295 ASSAY OF SERUM SODIUM: CPT

## 2024-09-29 PROCEDURE — 97165 OT EVAL LOW COMPLEX 30 MIN: CPT

## 2024-09-29 PROCEDURE — 82435 ASSAY OF BLOOD CHLORIDE: CPT

## 2024-09-29 PROCEDURE — 0042T: CPT | Mod: MC

## 2024-09-29 PROCEDURE — 82803 BLOOD GASES ANY COMBINATION: CPT

## 2024-09-29 PROCEDURE — 70450 CT HEAD/BRAIN W/O DYE: CPT | Mod: MC

## 2024-09-29 PROCEDURE — 83605 ASSAY OF LACTIC ACID: CPT

## 2024-12-17 ENCOUNTER — APPOINTMENT (OUTPATIENT)
Dept: VASCULAR SURGERY | Facility: CLINIC | Age: 69
End: 2024-12-17

## 2025-01-01 ENCOUNTER — NON-APPOINTMENT (OUTPATIENT)
Age: 70
End: 2025-01-01

## 2025-01-02 ENCOUNTER — APPOINTMENT (OUTPATIENT)
Dept: VASCULAR SURGERY | Facility: CLINIC | Age: 70
End: 2025-01-02
Payer: COMMERCIAL

## 2025-01-02 PROCEDURE — 93880 EXTRACRANIAL BILAT STUDY: CPT

## 2025-01-02 PROCEDURE — 99214 OFFICE O/P EST MOD 30 MIN: CPT

## 2025-04-01 ENCOUNTER — APPOINTMENT (OUTPATIENT)
Dept: CARDIOLOGY | Facility: CLINIC | Age: 70
End: 2025-04-01

## 2025-07-03 ENCOUNTER — APPOINTMENT (OUTPATIENT)
Dept: VASCULAR SURGERY | Facility: CLINIC | Age: 70
End: 2025-07-03
Payer: COMMERCIAL

## 2025-07-03 PROCEDURE — 93923 UPR/LXTR ART STDY 3+ LVLS: CPT

## 2025-07-03 PROCEDURE — 93880 EXTRACRANIAL BILAT STUDY: CPT

## 2025-07-03 PROCEDURE — 99214 OFFICE O/P EST MOD 30 MIN: CPT

## 2025-07-25 ENCOUNTER — NON-APPOINTMENT (OUTPATIENT)
Age: 70
End: 2025-07-25